# Patient Record
Sex: MALE | Race: WHITE | NOT HISPANIC OR LATINO | Employment: FULL TIME | URBAN - METROPOLITAN AREA
[De-identification: names, ages, dates, MRNs, and addresses within clinical notes are randomized per-mention and may not be internally consistent; named-entity substitution may affect disease eponyms.]

---

## 2017-01-03 RX ORDER — SIMVASTATIN 40 MG
TABLET ORAL
Qty: 30 TABLET | Refills: 0 | Status: SHIPPED | OUTPATIENT
Start: 2017-01-03 | End: 2017-02-03 | Stop reason: SDUPTHER

## 2017-01-03 RX ORDER — ATENOLOL 25 MG/1
TABLET ORAL
Qty: 30 TABLET | Refills: 0 | Status: SHIPPED | OUTPATIENT
Start: 2017-01-03 | End: 2017-02-03 | Stop reason: SDUPTHER

## 2017-02-03 RX ORDER — SIMVASTATIN 40 MG
TABLET ORAL
Qty: 30 TABLET | Refills: 0 | Status: SHIPPED | OUTPATIENT
Start: 2017-02-03 | End: 2017-02-13 | Stop reason: SDUPTHER

## 2017-02-03 RX ORDER — ATENOLOL 25 MG/1
TABLET ORAL
Qty: 30 TABLET | Refills: 0 | Status: SHIPPED | OUTPATIENT
Start: 2017-02-03 | End: 2017-02-13 | Stop reason: SDUPTHER

## 2017-02-13 ENCOUNTER — OFFICE VISIT (OUTPATIENT)
Dept: FAMILY MEDICINE CLINIC | Facility: CLINIC | Age: 62
End: 2017-02-13

## 2017-02-13 VITALS
HEIGHT: 69 IN | DIASTOLIC BLOOD PRESSURE: 80 MMHG | BODY MASS INDEX: 30.51 KG/M2 | WEIGHT: 206 LBS | TEMPERATURE: 98.4 F | SYSTOLIC BLOOD PRESSURE: 132 MMHG

## 2017-02-13 DIAGNOSIS — I70.90 ARTERIAL OCCLUSION DUE TO ARTERIOSCLEROSIS: ICD-10-CM

## 2017-02-13 DIAGNOSIS — K21.00 GASTROESOPHAGEAL REFLUX DISEASE WITH ESOPHAGITIS: ICD-10-CM

## 2017-02-13 DIAGNOSIS — C91.01 ACUTE LYMPHOBLASTIC LEUKEMIA (ALL) IN REMISSION (HCC): Primary | ICD-10-CM

## 2017-02-13 DIAGNOSIS — E78.5 HYPERLIPIDEMIA, ACQUIRED: ICD-10-CM

## 2017-02-13 PROCEDURE — 99213 OFFICE O/P EST LOW 20 MIN: CPT | Performed by: FAMILY MEDICINE

## 2017-02-13 RX ORDER — SIMVASTATIN 40 MG
40 TABLET ORAL NIGHTLY
Qty: 30 TABLET | Refills: 5 | Status: SHIPPED | OUTPATIENT
Start: 2017-02-13 | End: 2017-08-14 | Stop reason: SDUPTHER

## 2017-02-13 RX ORDER — ATENOLOL 25 MG/1
25 TABLET ORAL DAILY
Qty: 30 TABLET | Refills: 5 | Status: SHIPPED | OUTPATIENT
Start: 2017-02-13 | End: 2017-08-14 | Stop reason: SDUPTHER

## 2017-02-13 NOTE — PROGRESS NOTES
Chief Complaint   Patient presents with   • Hyperlipidemia     follow up       Subjective.../HPI  Patient present today with hyperlipidemia and htn . No problems with meds. 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 Has been to dr kong from vascular for left subclavian occlusion- subclavian steel syndrome.    I have reviewed the patient's medical history in detail and updated the computerized patient record.    Family History   Problem Relation Age of Onset   • Stroke Mother    • Heart attack Mother    • Alcohol abuse Father    • No Known Problems Sister    • No Known Problems Sister    • Hypertension Other        Social History     Social History   • Marital status:      Spouse name: N/A   • Number of children: N/A   • Years of education: N/A     Occupational History   •  [employer]     Social History Main Topics   • Smoking status: Former Smoker     Packs/day: 0.50     Types: Cigarettes     Quit date: 1/3/2016   • Smokeless tobacco: Former User   • Alcohol use No      Comment: daily   • Drug use: Not on file   • Sexual activity: Yes     Partners: Female     Birth control/ protection: None     Other Topics Concern   • Not on file     Social History Narrative       Review of Systems:   Review of Systems   Constitutional: Negative for chills, fatigue, fever and unexpected weight change.   HENT: Negative for ear pain, hearing loss, sinus pressure, sore throat and tinnitus.    Eyes: Negative for pain, discharge and redness.   Respiratory: Negative for cough, shortness of breath and wheezing.    Cardiovascular: Negative for chest pain, palpitations and leg swelling.   Gastrointestinal: Negative for abdominal pain, constipation, diarrhea and nausea.   Endocrine: Negative for cold intolerance and heat intolerance.   Genitourinary: Negative for difficulty urinating, flank pain and urgency.   Musculoskeletal: Negative for back pain, joint swelling and myalgias.   Skin: Negative for rash  "and wound.   Allergic/Immunologic: Negative for environmental allergies and food allergies.   Neurological: Negative for dizziness, seizures, numbness and headaches.   Hematological: Negative for adenopathy. Does not bruise/bleed easily.   Psychiatric/Behavioral: Negative for decreased concentration, dysphoric mood and sleep disturbance. The patient is not nervous/anxious.    All other systems reviewed and are negative.      Objective:  Vital Signs     Vitals:    02/13/17 1126   BP: 132/80   BP Location: Right arm   Patient Position: Sitting   Temp: 98.4 °F (36.9 °C)   TempSrc: Oral   Weight: 206 lb (93.4 kg)   Height: 69.29\" (176 cm)     Physical Exam   Constitutional: He is oriented to person, place, and time. He appears well-developed and well-nourished.   HENT:   Head: Normocephalic.   Eyes: Pupils are equal, round, and reactive to light.   Neck: Normal range of motion.   Cardiovascular: Normal rate and regular rhythm.    Pulmonary/Chest: Effort normal.   Abdominal: Soft.   Musculoskeletal: Normal range of motion.   Neurological: He is alert and oriented to person, place, and time.   Skin: Skin is warm and dry.   Psychiatric: He has a normal mood and affect. His behavior is normal. Judgment and thought content normal.        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

## 2017-03-20 ENCOUNTER — HOSPITAL ENCOUNTER (OUTPATIENT)
Dept: GENERAL RADIOLOGY | Facility: HOSPITAL | Age: 62
Discharge: HOME | End: 2017-03-20
Attending: FAMILY MEDICINE

## 2017-03-20 ENCOUNTER — OFFICE VISIT (OUTPATIENT)
Dept: FAMILY MEDICINE CLINIC | Facility: CLINIC | Age: 62
End: 2017-03-20

## 2017-03-20 ENCOUNTER — HOSPITAL ENCOUNTER (OUTPATIENT)
Dept: GENERAL RADIOLOGY | Facility: HOSPITAL | Age: 62
End: 2017-03-20
Attending: FAMILY MEDICINE

## 2017-03-20 VITALS
TEMPERATURE: 99.2 F | HEIGHT: 69 IN | SYSTOLIC BLOOD PRESSURE: 142 MMHG | DIASTOLIC BLOOD PRESSURE: 90 MMHG | WEIGHT: 209.6 LBS | OXYGEN SATURATION: 98 % | BODY MASS INDEX: 31.04 KG/M2 | HEART RATE: 81 BPM

## 2017-03-20 DIAGNOSIS — S23.9XXA THORACIC SPRAIN: Primary | ICD-10-CM

## 2017-03-20 DIAGNOSIS — S33.5XXA LUMBAR SPRAIN, INITIAL ENCOUNTER: ICD-10-CM

## 2017-03-20 PROCEDURE — 99213 OFFICE O/P EST LOW 20 MIN: CPT | Performed by: FAMILY MEDICINE

## 2017-03-20 RX ORDER — CYCLOBENZAPRINE HCL 10 MG
10 TABLET ORAL NIGHTLY PRN
Qty: 30 TABLET | Refills: 0 | Status: SHIPPED | OUTPATIENT
Start: 2017-03-20 | End: 2017-03-27

## 2017-03-20 RX ORDER — METHYLPREDNISOLONE 4 MG/1
TABLET ORAL
Qty: 21 TABLET | Refills: 0 | Status: SHIPPED | OUTPATIENT
Start: 2017-03-20 | End: 2017-03-27

## 2017-03-20 NOTE — PROGRESS NOTES
"  Chief Complaint   Patient presents with   • Back Pain     pt is been with middle back pain for 3 days is a permanet pain, it get worse when he moves ,       Subjective.../HPI  Patient present today with back pain x 5 days. No trauma. Pain on right. On same mattress. Not on couch. No leg pain.     I have reviewed the patient's medical history in detail and updated the computerized patient record.    Family History   Problem Relation Age of Onset   • Stroke Mother    • Heart attack Mother    • Alcohol abuse Father    • No Known Problems Sister    • No Known Problems Sister    • Hypertension Other        Social History     Social History   • Marital status:      Spouse name: N/A   • Number of children: N/A   • Years of education: N/A     Occupational History   •  [employer]     Social History Main Topics   • Smoking status: Former Smoker     Packs/day: 0.50     Types: Cigarettes     Quit date: 1/3/2016   • Smokeless tobacco: Former User   • Alcohol use No      Comment: daily   • Drug use: Not on file   • Sexual activity: Yes     Partners: Female     Birth control/ protection: None     Other Topics Concern   • Not on file     Social History Narrative       Review of Systems:   Review of Systems   Constitutional: Negative.    HENT: Negative.    Eyes: Negative.    Respiratory: Negative.    Cardiovascular: Negative.    Gastrointestinal: Positive for constipation.   Endocrine: Negative.    Musculoskeletal: Positive for back pain.   Skin: Negative.    Allergic/Immunologic: Positive for environmental allergies.   Neurological: Negative.    Hematological: Negative.    Psychiatric/Behavioral: Negative.        Objective:  Vital Signs     Vitals:    03/20/17 1101   BP: 142/90   BP Location: Right arm   Patient Position: Sitting   Pulse: 81   Temp: 99.2 °F (37.3 °C)   TempSrc: Oral   SpO2: 98%   Weight: 209 lb 9.6 oz (95.1 kg)   Height: 69.29\" (176 cm)     Physical Exam   Constitutional: He is oriented to " person, place, and time. He appears well-developed and well-nourished.   HENT:   Head: Normocephalic.   Eyes: Pupils are equal, round, and reactive to light.   Neck: Normal range of motion.   Cardiovascular: Normal rate and regular rhythm.    Pulmonary/Chest: Effort normal.   Musculoskeletal: He exhibits tenderness (right paraspinal muscle in spasm).   Swollen and tender right paraspinal muscle   Neurological: He is alert and oriented to person, place, and time.   Skin: Skin is warm and dry.        Results Review:      REVIEWED AND DISCUSSED CLINICAL RESULTS WITH PATIENT                          Current Outpatient Prescriptions:   •  aspirin 81 MG tablet, Take 81 mg by mouth daily., Disp: , Rfl:   •  atenolol (TENORMIN) 25 MG tablet, Take 1 tablet by mouth Daily., Disp: 30 tablet, Rfl: 5  •  simvastatin (ZOCOR) 40 MG tablet, Take 1 tablet by mouth Every Night., Disp: 30 tablet, Rfl: 5  •  cyclobenzaprine (FLEXERIL) 10 MG tablet, Take 1 tablet by mouth At Night As Needed for Muscle Spasms., Disp: 30 tablet, Rfl: 0  •  MethylPREDNISolone (MEDROL, THOM,) 4 MG tablet, Take as directed on package instructions., Disp: 21 tablet, Rfl: 0    Procedures    Assessment/Plan     Diagnoses and all orders for this visit:    Thoracic sprain  -     XR spine thoracic 3 vw  -     XR spine lumbar ap and lateral    Lumbar sprain, initial encounter  -     XR spine thoracic 3 vw  -     XR spine lumbar ap and lateral    Other orders  -     cyclobenzaprine (FLEXERIL) 10 MG tablet; Take 1 tablet by mouth At Night As Needed for Muscle Spasms.  -     MethylPREDNISolone (MEDROL, THOM,) 4 MG tablet; Take as directed on package instructions.         Teto Russell Jr., MD  03/20/17  11:43 AM

## 2017-03-27 ENCOUNTER — OFFICE VISIT (OUTPATIENT)
Dept: ONCOLOGY | Facility: CLINIC | Age: 62
End: 2017-03-27

## 2017-03-27 ENCOUNTER — LAB (OUTPATIENT)
Dept: LAB | Facility: HOSPITAL | Age: 62
End: 2017-03-27

## 2017-03-27 VITALS
DIASTOLIC BLOOD PRESSURE: 76 MMHG | WEIGHT: 210 LBS | BODY MASS INDEX: 31.1 KG/M2 | SYSTOLIC BLOOD PRESSURE: 142 MMHG | HEART RATE: 73 BPM | TEMPERATURE: 98.6 F | OXYGEN SATURATION: 99 % | HEIGHT: 69 IN | RESPIRATION RATE: 12 BRPM

## 2017-03-27 DIAGNOSIS — C91.10 CLL (CHRONIC LYMPHOCYTIC LEUKEMIA) (HCC): ICD-10-CM

## 2017-03-27 DIAGNOSIS — C91.11 CHRONIC LYMPHOCYTIC LEUKEMIA OF B-CELL TYPE IN REMISSION (HCC): Primary | ICD-10-CM

## 2017-03-27 LAB
BASOPHILS # BLD AUTO: 0.03 10*3/MM3 (ref 0–0.1)
BASOPHILS NFR BLD AUTO: 0.5 % (ref 0–1.1)
DEPRECATED RDW RBC AUTO: 39.3 FL (ref 37–49)
EOSINOPHIL # BLD AUTO: 0.1 10*3/MM3 (ref 0–0.36)
EOSINOPHIL NFR BLD AUTO: 1.6 % (ref 1–5)
ERYTHROCYTE [DISTWIDTH] IN BLOOD BY AUTOMATED COUNT: 12.4 % (ref 11.7–14.5)
HCT VFR BLD AUTO: 47.5 % (ref 40–49)
HGB BLD-MCNC: 15.9 G/DL (ref 13.5–16.5)
IMM GRANULOCYTES # BLD: 0.08 10*3/MM3 (ref 0–0.03)
IMM GRANULOCYTES NFR BLD: 1.3 % (ref 0–0.5)
LYMPHOCYTES # BLD AUTO: 0.92 10*3/MM3 (ref 1–3.5)
LYMPHOCYTES NFR BLD AUTO: 14.4 % (ref 20–49)
MCH RBC QN AUTO: 28.9 PG (ref 27–33)
MCHC RBC AUTO-ENTMCNC: 33.5 G/DL (ref 32–35)
MCV RBC AUTO: 86.4 FL (ref 83–97)
MONOCYTES # BLD AUTO: 0.63 10*3/MM3 (ref 0.25–0.8)
MONOCYTES NFR BLD AUTO: 9.9 % (ref 4–12)
NEUTROPHILS # BLD AUTO: 4.61 10*3/MM3 (ref 1.5–7)
NEUTROPHILS NFR BLD AUTO: 72.3 % (ref 39–75)
NRBC BLD MANUAL-RTO: 0 /100 WBC (ref 0–0)
PLATELET # BLD AUTO: 253 10*3/MM3 (ref 150–375)
PMV BLD AUTO: 8.8 FL (ref 8.9–12.1)
RBC # BLD AUTO: 5.5 10*6/MM3 (ref 4.3–5.5)
WBC NRBC COR # BLD: 6.37 10*3/MM3 (ref 4–10)

## 2017-03-27 PROCEDURE — 85025 COMPLETE CBC W/AUTO DIFF WBC: CPT | Performed by: INTERNAL MEDICINE

## 2017-03-27 PROCEDURE — 36416 COLLJ CAPILLARY BLOOD SPEC: CPT | Performed by: INTERNAL MEDICINE

## 2017-03-27 PROCEDURE — 99213 OFFICE O/P EST LOW 20 MIN: CPT | Performed by: INTERNAL MEDICINE

## 2017-07-31 ENCOUNTER — LAB (OUTPATIENT)
Dept: LAB | Facility: HOSPITAL | Age: 62
End: 2017-07-31

## 2017-07-31 ENCOUNTER — OFFICE VISIT (OUTPATIENT)
Dept: ONCOLOGY | Facility: CLINIC | Age: 62
End: 2017-07-31

## 2017-07-31 VITALS
HEART RATE: 76 BPM | TEMPERATURE: 98.4 F | BODY MASS INDEX: 31.46 KG/M2 | RESPIRATION RATE: 16 BRPM | WEIGHT: 212.4 LBS | SYSTOLIC BLOOD PRESSURE: 130 MMHG | DIASTOLIC BLOOD PRESSURE: 80 MMHG | HEIGHT: 69 IN

## 2017-07-31 DIAGNOSIS — C91.11 CHRONIC LYMPHOCYTIC LEUKEMIA OF B-CELL TYPE IN REMISSION (HCC): Primary | ICD-10-CM

## 2017-07-31 DIAGNOSIS — I70.90 ARTERIAL OCCLUSION DUE TO ARTERIOSCLEROSIS: ICD-10-CM

## 2017-07-31 DIAGNOSIS — C91.11 CHRONIC LYMPHOCYTIC LEUKEMIA OF B-CELL TYPE IN REMISSION (HCC): ICD-10-CM

## 2017-07-31 LAB
BASOPHILS # BLD AUTO: 0.04 10*3/MM3 (ref 0–0.1)
BASOPHILS NFR BLD AUTO: 0.7 % (ref 0–1.1)
DEPRECATED RDW RBC AUTO: 38.8 FL (ref 37–49)
EOSINOPHIL # BLD AUTO: 0.12 10*3/MM3 (ref 0–0.36)
EOSINOPHIL NFR BLD AUTO: 2.2 % (ref 1–5)
ERYTHROCYTE [DISTWIDTH] IN BLOOD BY AUTOMATED COUNT: 12.3 % (ref 11.7–14.5)
HCT VFR BLD AUTO: 44.1 % (ref 40–49)
HGB BLD-MCNC: 15.3 G/DL (ref 13.5–16.5)
IMM GRANULOCYTES # BLD: 0.03 10*3/MM3 (ref 0–0.03)
IMM GRANULOCYTES NFR BLD: 0.6 % (ref 0–0.5)
LYMPHOCYTES # BLD AUTO: 0.88 10*3/MM3 (ref 1–3.5)
LYMPHOCYTES NFR BLD AUTO: 16.2 % (ref 20–49)
MCH RBC QN AUTO: 30.1 PG (ref 27–33)
MCHC RBC AUTO-ENTMCNC: 34.7 G/DL (ref 32–35)
MCV RBC AUTO: 86.6 FL (ref 83–97)
MONOCYTES # BLD AUTO: 0.51 10*3/MM3 (ref 0.25–0.8)
MONOCYTES NFR BLD AUTO: 9.4 % (ref 4–12)
NEUTROPHILS # BLD AUTO: 3.86 10*3/MM3 (ref 1.5–7)
NEUTROPHILS NFR BLD AUTO: 70.9 % (ref 39–75)
NRBC BLD MANUAL-RTO: 0 /100 WBC (ref 0–0)
PLATELET # BLD AUTO: 228 10*3/MM3 (ref 150–375)
PMV BLD AUTO: 8.8 FL (ref 8.9–12.1)
RBC # BLD AUTO: 5.09 10*6/MM3 (ref 4.3–5.5)
WBC NRBC COR # BLD: 5.44 10*3/MM3 (ref 4–10)

## 2017-07-31 PROCEDURE — 36416 COLLJ CAPILLARY BLOOD SPEC: CPT | Performed by: INTERNAL MEDICINE

## 2017-07-31 PROCEDURE — 99213 OFFICE O/P EST LOW 20 MIN: CPT | Performed by: INTERNAL MEDICINE

## 2017-07-31 PROCEDURE — 85025 COMPLETE CBC W/AUTO DIFF WBC: CPT | Performed by: INTERNAL MEDICINE

## 2017-07-31 NOTE — PROGRESS NOTES
Subjective  Chronic lymphoid leukemia. As per 06/23/2016, the patient’s leukemia has gone into remission after therapy with Rituxan/bendamustine.  This is documented by physical examination, CBC, chemistry profile, beta-2 microglobulin that is normal, and most importantly, peripheral blood flow cytometry that has documented resolution of his monoclonal population of B cells.  We advised him to remain in observation, returning to see us every 3 months.  No need for any other pathological assessment under the present circumstances.       Left subclavian obstruction associated with symptomatology into the left upper extremity including claudication upon exercise, coldness and occasional numbness.  The patient has been seen by Dr. Stevan Hdz.      History of Present Illness      This patient is here today in order to reassess his chronic lymphoid leukemia that was treated with Rituxan and bendamustine in the past achieving a remission. He is here today with no B symptoms, no peripheral adenopathy, no fever, no chills, no infections, no autoimmunity, feeling extremely well. ECOG performance status 0. From the point of view of his arterial occlusion in the left subclavian artery no major symptomatology as long as he does not use his left upper extremity too much. No plans for him to have any surgery or stenting for this unless the clinical circumstances change. Otherwise the patient has not had any new health issues. Appetite is good, weight remains stable. All activity of urination remains normal. No cardiovascular or respiratory symptomatology. No rashes in the skin.         Past Medical History,   Past Medical History:   Diagnosis Date   • Cancer     chronic lymphoid leukemia   • History of rotator cuff strain    • Hyperlipidemia    • Hypertension    • Reflux esophagitis    • Subclavian vein obstruction      Social History     Social History   • Marital status:      Spouse name: N/A   • Number of children:  N/A   • Years of education: N/A     Occupational History   •  [employer]     Social History Main Topics   • Smoking status: Former Smoker     Packs/day: 0.50     Types: Cigarettes     Quit date: 1/3/2016   • Smokeless tobacco: Former User   • Alcohol use No      Comment: daily   • Drug use: Not on file   • Sexual activity: Yes     Partners: Female     Birth control/ protection: None     Other Topics Concern   • Not on file     Social History Narrative         Review of Systems    General: no fever, chills, fatigue, weight changes, or lack of appetite.  Eyes: no epiphora, conjunctivitis, pain, glaucoma, blurred vision, blindness, secretion, photophobia.  Ears: no otorrhea, tinnitus, otorrhagia, deafness, pain, vertigo.  Nose: no rhinorrhea, epistaxis, alteration in perception of odors, sinuses pressure.  Mouth: no alteration in gums ,, no difficulty with mastication or deglut ion, no odynophagia.  Neck: no masses or pain, no thyroid alterations, no pain in muscles or arteries, no carotid odynia, no crepitation.  Lungs: no cough, sputum production, dyspnea, trepopnea, pleuritic pain, hemoptysis.  Heart: no syncope, irregularity, palpitations, angina, orthopnea, paroxysmal nocturnal dyspnea.  GI: no dysphagia, odynophagia, no regurgitation, positive for heartburn and indigestion,no nausea, vomiting, hematemesis ,melena, jaundice, distention, obstipation, enterorrhagia, proctalgia, anal  Lesions, changes in bowel habits.  : no frequency, hesitancy, hematuria, discharge, pain.  Musculoskeletal: no muscle or tendon pain or inflammation, joint pain, edema, functional limitation, fasciculations, mass..  Neurologic: no headache, seizures, alterations on Craneal nerves, no motor or senssory deficit, normal coordination.  Skin: no rashes, pruritus or localized lesions.  Psychiatric: no anxiety, depression, agitation, delusions, proper insight.  .    Medications:  The current medication list was reviewed in  "the EMR    ALLERGIES:    Allergies   Allergen Reactions   • Ketoprofen Hives       Objective      Vitals:    07/31/17 0819   BP: 130/80   Pulse: 76   Resp: 16   Temp: 98.4 °F (36.9 °C)   Weight: 212 lb 6.4 oz (96.3 kg)   Height: 68.9\" (175 cm)  Comment: new ht.   PainSc: 0-No pain     Current Status 7/31/2017   ECOG score 0       Physical Exam    GENERAL:  Well-developed, well-nourished  Patient  in no acute distress.   SKIN:  Warm, dry without rashes, purpura or petechiae.  HEENT:  Pupils were equal and reactive to light and accomodation, conjunctivas non injected, no pterigion, normal extraocular movements, normal visual acuity.   Mouth mucosa was moist, no exudates in oropharynx, normal gum line, normal roof of the mouth and pillars, normal papillations of the tongue.SWOLLEN R SNEHA K WITH PAIN  NECK:  Supple with good range of motion; no thyromegaly or masses, no JVD or bruits, no cervical adenopathies.  LYMPHATICS:  No cervical, supraclavicular, axillary or inguinal adenopathy.  CHEST:  Lungs clear to percussion and auscultation, normal breath sounds bilaterally, no wheezing, crackles or ronchi, no stridor.  CARDIAC:  Regular rate and rhythm without murmurs, rubs or gallops.  ABDOMEN:  Soft, nontender with no organomegaly or masses, no ascites, no collateral circulation, no abdominal pain, no inguinal hernias, no abdominal bruits.  EXTREMITIES:  No clubbing, cyanosis or edema, no deformities or pain.Poor pulses and temperature in left upper extremity, obvious decrease in temperature, no paleness or cyanosis.  NEUROLOGICAL:  Patient was awake, alert, oriented to time, person and place,normal gait and coordination,     RECENT LABS:  Hematology WBC   Date Value Ref Range Status   07/31/2017 5.44 4.00 - 10.00 10*3/mm3 Final     RBC   Date Value Ref Range Status   07/31/2017 5.09 4.30 - 5.50 10*6/mm3 Final     Hemoglobin   Date Value Ref Range Status   07/31/2017 15.3 13.5 - 16.5 g/dL Final     Hematocrit   Date " Value Ref Range Status   07/31/2017 44.1 40.0 - 49.0 % Final     Platelets   Date Value Ref Range Status   07/31/2017 228 150 - 375 10*3/mm3 Final        Component      Latest Ref Rng & Units 3/27/2017 7/31/2017   WBC      4.00 - 10.00 10*3/mm3 6.37 5.44   RBC      4.30 - 5.50 10*6/mm3 5.50 5.09   Hemoglobin      13.5 - 16.5 g/dL 15.9 15.3   Hematocrit      40.0 - 49.0 % 47.5 44.1   MCV      83.0 - 97.0 fL 86.4 86.6   MCH      27.0 - 33.0 pg 28.9 30.1   MCHC      32.0 - 35.0 g/dL 33.5 34.7   RDW      11.7 - 14.5 % 12.4 12.3   RDW-SD      37.0 - 49.0 fl 39.3 38.8   MPV      8.9 - 12.1 fL 8.8 (L) 8.8 (L)   Platelets      150 - 375 10*3/mm3 253 228   Neutrophil %      39.0 - 75.0 % 72.3 70.9   Lymphocyte %      20.0 - 49.0 % 14.4 (L) 16.2 (L)   Monocyte %      4.0 - 12.0 % 9.9 9.4   Eosinophil %      1.0 - 5.0 % 1.6 2.2   Basophil %      0.0 - 1.1 % 0.5 0.7   Immature Grans %      0.0 - 0.5 % 1.3 (H) 0.6 (H)   Neutrophils, Absolute      1.50 - 7.00 10*3/mm3 4.61 3.86   Lymphocytes, Absolute      1.00 - 3.50 10*3/mm3 0.92 (L) 0.88 (L)   Monocytes, Absolute      0.25 - 0.80 10*3/mm3 0.63 0.51   Eosinophils, Absolute      0.00 - 0.36 10*3/mm3 0.10 0.12   Basophils, Absolute      0.00 - 0.10 10*3/mm3 0.03 0.04   Immature Grans, Absolute      0.00 - 0.03 10*3/mm3 0.08 (H) 0.03   nRBC      0.0 - 0.0 /100 WBC 0.0 0.0         Assessment/Plan   1.  This patient has had a very dramatic response to chemotherapy with bendamustine and Rituxan in the treatment of CLL. .  The patient has not had any obvious adenopathy; no hepatosplenomegaly, no B symptoms, no autoimmunity, no infections. .  His white count, hemoglobin and platelets have returned to normality.  The white count differential does not show any further lymphocytosis.  .  We propose a plan of care with just blood count every 6 months for the next year.  2.  Atherosclerotic obstruction of the left subclavian artery.  The patient is no longer smoking.  He is taking aspirin,  atenolol and cholesterol medicine with dramatic improvement in his lipid profile.  Again, most importantly, the patient is no longer smoking.    3.  Review him back in 6 months with a CBC.  No need for radiological assessment at this time of any nature unless the clinical circumstances change.                          7/31/2017      CC:

## 2017-08-14 ENCOUNTER — OFFICE VISIT (OUTPATIENT)
Dept: FAMILY MEDICINE CLINIC | Facility: CLINIC | Age: 62
End: 2017-08-14

## 2017-08-14 VITALS
BODY MASS INDEX: 30.81 KG/M2 | SYSTOLIC BLOOD PRESSURE: 118 MMHG | HEART RATE: 69 BPM | HEIGHT: 69 IN | DIASTOLIC BLOOD PRESSURE: 84 MMHG | TEMPERATURE: 98.2 F | WEIGHT: 208 LBS

## 2017-08-14 DIAGNOSIS — E78.01 FAMILIAL HYPERCHOLESTEROLEMIA: ICD-10-CM

## 2017-08-14 DIAGNOSIS — I10 ESSENTIAL HYPERTENSION: Primary | ICD-10-CM

## 2017-08-14 PROCEDURE — 99213 OFFICE O/P EST LOW 20 MIN: CPT | Performed by: FAMILY MEDICINE

## 2017-08-14 PROCEDURE — 90471 IMMUNIZATION ADMIN: CPT | Performed by: FAMILY MEDICINE

## 2017-08-14 PROCEDURE — 90715 TDAP VACCINE 7 YRS/> IM: CPT | Performed by: FAMILY MEDICINE

## 2017-08-14 RX ORDER — SIMVASTATIN 40 MG
40 TABLET ORAL NIGHTLY
Qty: 30 TABLET | Refills: 5 | Status: SHIPPED | OUTPATIENT
Start: 2017-08-14 | End: 2018-02-07 | Stop reason: SDUPTHER

## 2017-08-14 RX ORDER — ATENOLOL 25 MG/1
25 TABLET ORAL DAILY
Qty: 30 TABLET | Refills: 5 | Status: SHIPPED | OUTPATIENT
Start: 2017-08-14 | End: 2018-02-07 | Stop reason: SDUPTHER

## 2017-08-14 NOTE — PROGRESS NOTES
"  Chief Complaint   Patient presents with   • Hyperlipidemia     follow up pt doing well,no complains       Subjective.../HPI  Patient present today with htn  - hyperlipidemia and doing well on meds. No myalgia    I have reviewed the patient's medical history in detail and updated the computerized patient record.    Family History   Problem Relation Age of Onset   • Stroke Mother    • Heart attack Mother    • Alcohol abuse Father    • No Known Problems Sister    • No Known Problems Sister    • Hypertension Other        Social History     Social History   • Marital status:      Spouse name: N/A   • Number of children: N/A   • Years of education: N/A     Occupational History   •  [employer]     Social History Main Topics   • Smoking status: Former Smoker     Packs/day: 0.50     Types: Cigarettes     Quit date: 1/3/2016   • Smokeless tobacco: Former User   • Alcohol use No      Comment: daily   • Drug use: Not on file   • Sexual activity: Yes     Partners: Female     Birth control/ protection: None     Other Topics Concern   • Not on file     Social History Narrative       Review of Systems:   Review of Systems   Constitutional: Negative.    HENT: Negative.    Eyes: Negative.    Respiratory: Negative.    Cardiovascular: Negative.    Gastrointestinal: Negative.    Endocrine: Negative.    Genitourinary: Negative.    Musculoskeletal: Negative.    Skin: Negative.    Allergic/Immunologic: Negative.    Neurological: Negative.    Hematological: Negative.    Psychiatric/Behavioral: Negative.        Objective:  Vital Signs     Vitals:    08/14/17 1150   BP: 118/84   BP Location: Right arm   Patient Position: Sitting   Pulse: 69   Temp: 98.2 °F (36.8 °C)   TempSrc: Oral   Weight: 208 lb (94.3 kg)   Height: 68.5\" (174 cm)     Physical Exam   Constitutional: He is oriented to person, place, and time. He appears well-developed and well-nourished.   HENT:   Head: Normocephalic.   Eyes: Pupils are equal, " round, and reactive to light.   Neck: Normal range of motion.   Cardiovascular: Normal rate, regular rhythm and normal heart sounds.    Pulmonary/Chest: Effort normal.   Abdominal: Soft.   Musculoskeletal: Normal range of motion.   Neurological: He is alert and oriented to person, place, and time.   Skin: Skin is warm and dry.        Results Review:      REVIEWED AND DISCUSSED CLINICAL RESULTS WITH PATIENT                          Current Outpatient Prescriptions:   •  aspirin 81 MG tablet, Take 81 mg by mouth daily., Disp: , Rfl:   •  atenolol (TENORMIN) 25 MG tablet, Take 1 tablet by mouth Daily., Disp: 30 tablet, Rfl: 5  •  simvastatin (ZOCOR) 40 MG tablet, Take 1 tablet by mouth Every Night., Disp: 30 tablet, Rfl: 5    Procedures    Assessment/Plan     Diagnoses and all orders for this visit:    Essential hypertension    Familial hypercholesterolemia    Other orders  -     atenolol (TENORMIN) 25 MG tablet; Take 1 tablet by mouth Daily.  -     simvastatin (ZOCOR) 40 MG tablet; Take 1 tablet by mouth Every Night.  -     Tdap Vaccine Greater Than or Equal To 6yo          Teto Russell Jr., MD  08/14/17  12:40 PM

## 2018-01-23 ENCOUNTER — LAB (OUTPATIENT)
Dept: OTHER | Facility: HOSPITAL | Age: 63
End: 2018-01-23

## 2018-01-23 ENCOUNTER — OFFICE VISIT (OUTPATIENT)
Dept: ONCOLOGY | Facility: CLINIC | Age: 63
End: 2018-01-23

## 2018-01-23 VITALS
TEMPERATURE: 98.4 F | RESPIRATION RATE: 12 BRPM | DIASTOLIC BLOOD PRESSURE: 82 MMHG | WEIGHT: 212 LBS | OXYGEN SATURATION: 100 % | SYSTOLIC BLOOD PRESSURE: 140 MMHG | HEART RATE: 72 BPM | BODY MASS INDEX: 31.4 KG/M2 | HEIGHT: 69 IN

## 2018-01-23 DIAGNOSIS — C91.11 CHRONIC LYMPHOCYTIC LEUKEMIA OF B-CELL TYPE IN REMISSION (HCC): Primary | ICD-10-CM

## 2018-01-23 DIAGNOSIS — C91.11 CHRONIC LYMPHOCYTIC LEUKEMIA OF B-CELL TYPE IN REMISSION (HCC): ICD-10-CM

## 2018-01-23 DIAGNOSIS — I70.90 ARTERIAL OCCLUSION DUE TO ARTERIOSCLEROSIS: ICD-10-CM

## 2018-01-23 LAB
BASOPHILS # BLD AUTO: 0.03 10*3/MM3 (ref 0–0.2)
BASOPHILS NFR BLD AUTO: 0.5 % (ref 0–1.5)
DEPRECATED RDW RBC AUTO: 38.5 FL (ref 37–54)
EOSINOPHIL # BLD AUTO: 0.08 10*3/MM3 (ref 0–0.7)
EOSINOPHIL NFR BLD AUTO: 1.2 % (ref 0.3–6.2)
ERYTHROCYTE [DISTWIDTH] IN BLOOD BY AUTOMATED COUNT: 12.6 % (ref 11.5–14.5)
HCT VFR BLD AUTO: 45.7 % (ref 40.4–52.2)
HGB BLD-MCNC: 16.1 G/DL (ref 13.7–17.6)
IMM GRANULOCYTES # BLD: 0.03 10*3/MM3 (ref 0–0.03)
IMM GRANULOCYTES NFR BLD: 0.5 % (ref 0–0.5)
LYMPHOCYTES # BLD AUTO: 0.99 10*3/MM3 (ref 0.9–4.8)
LYMPHOCYTES NFR BLD AUTO: 15.4 % (ref 19.6–45.3)
MCH RBC QN AUTO: 29.9 PG (ref 27–32.7)
MCHC RBC AUTO-ENTMCNC: 35.2 G/DL (ref 32.6–36.4)
MCV RBC AUTO: 84.9 FL (ref 79.8–96.2)
MONOCYTES # BLD AUTO: 0.42 10*3/MM3 (ref 0.2–1.2)
MONOCYTES NFR BLD AUTO: 6.5 % (ref 5–12)
NEUTROPHILS # BLD AUTO: 4.88 10*3/MM3 (ref 1.9–8.1)
NEUTROPHILS NFR BLD AUTO: 75.9 % (ref 42.7–76)
NRBC BLD MANUAL-RTO: 0 /100 WBC (ref 0–0)
PLATELET # BLD AUTO: 258 10*3/MM3 (ref 140–500)
PMV BLD AUTO: 8.8 FL (ref 6–12)
RBC # BLD AUTO: 5.38 10*6/MM3 (ref 4.6–6)
WBC NRBC COR # BLD: 6.43 10*3/MM3 (ref 4.5–10.7)

## 2018-01-23 PROCEDURE — 36415 COLL VENOUS BLD VENIPUNCTURE: CPT

## 2018-01-23 PROCEDURE — 85025 COMPLETE CBC W/AUTO DIFF WBC: CPT | Performed by: INTERNAL MEDICINE

## 2018-01-23 PROCEDURE — 99213 OFFICE O/P EST LOW 20 MIN: CPT | Performed by: INTERNAL MEDICINE

## 2018-01-23 NOTE — PROGRESS NOTES
Subjective  1.Chronic lymphoid leukemia. As per 06/23/2016, the patient’s leukemia has gone into remission after therapy with Rituxan/bendamustine.  This is documented by physical examination, CBC, chemistry profile, beta-2 microglobulin that is normal, and most importantly, peripheral blood flow cytometry that has documented resolution of his monoclonal population of B cells.  We advised him to remain in observation, returning to see us every 3 months.  No need for any other pathological assessment under the present circumstances.       2.Left subclavian obstruction associated with symptomatology into the left upper extremity including claudication upon exercise, coldness and occasional numbness.  The patient has been seen by Dr. Stevan Hdz.      History of Present Illness      This patient is here today in order to reassess his chronic lymphoid leukemia that was treated with Rituxan and bendamustine in the past achieving a remission. He is here today with no B symptoms, no peripheral adenopathy, no fever, no chills, no infections, no autoimmunity, feeling extremely well. ECOG performance status 0. From the point of view of his arterial occlusion in the left subclavian artery no major symptomatology as long as he does not use his left upper extremity too much. No plans for him to have any surgery or stenting for this unless the clinical circumstances change. Otherwise the patient has not had any new health issues. Appetite is good, weight remains stable. All activity of urination remains normal. No cardiovascular or respiratory symptomatology. No rashes in the skin.         Past Medical History,   Past Medical History:   Diagnosis Date   • Cancer     chronic lymphoid leukemia   • History of rotator cuff strain    • Hyperlipidemia    • Hypertension    • Reflux esophagitis    • Subclavian vein obstruction      Social History     Social History   • Marital status:      Spouse name: N/A   • Number of  children: N/A   • Years of education: N/A     Occupational History   •  [employer]     Social History Main Topics   • Smoking status: Former Smoker     Packs/day: 0.50     Types: Cigarettes     Quit date: 1/3/2016   • Smokeless tobacco: Former User   • Alcohol use No      Comment: daily   • Drug use: Not on file   • Sexual activity: Yes     Partners: Female     Birth control/ protection: None     Other Topics Concern   • Not on file     Social History Narrative         Review of Systems    General: no fever, chills, fatigue, weight changes, or lack of appetite.  Eyes: no epiphora, conjunctivitis, pain, glaucoma, blurred vision, blindness, secretion, photophobia.  Ears: no otorrhea, tinnitus, otorrhagia, deafness, pain, vertigo.  Nose: no rhinorrhea, epistaxis, alteration in perception of odors, sinuses pressure.  Mouth: no alteration in gums ,, no difficulty with mastication or deglut ion, no odynophagia.  Neck: no masses or pain, no thyroid alterations, no pain in muscles or arteries, no carotid odynia, no crepitation.  Lungs: no cough, sputum production, dyspnea, trepopnea, pleuritic pain, hemoptysis.  Heart: no syncope, irregularity, palpitations, angina, orthopnea, paroxysmal nocturnal dyspnea.  GI: no dysphagia, odynophagia, no regurgitation, positive for heartburn and indigestion,no nausea, vomiting, hematemesis ,melena, jaundice, distention, obstipation, enterorrhagia, proctalgia, anal  Lesions, changes in bowel habits.  : no frequency, hesitancy, hematuria, discharge, pain.  Musculoskeletal: no muscle or tendon pain or inflammation, joint pain, edema, functional limitation, fasciculations, mass..  Neurologic: no headache, seizures, alterations on Craneal nerves, no motor or senssory deficit, normal coordination.  Skin: no rashes, pruritus or localized lesions.  Psychiatric: no anxiety, depression, agitation, delusions, proper insight.  .    Medications:  The current medication list was  "reviewed in the EMR    ALLERGIES:    Allergies   Allergen Reactions   • Ketoprofen Hives       Objective      Vitals:    01/23/18 0850   BP: 140/82   Pulse: 72   Resp: 12   Temp: 98.4 °F (36.9 °C)   TempSrc: Oral   SpO2: 100%   Weight: 96.2 kg (212 lb)   Height: 175 cm (68.9\")   PainSc: 0-No pain     Current Status 1/23/2018   ECOG score 0       Physical Exam    GENERAL:  Well-developed, well-nourished  Patient  in no acute distress.   SKIN:  Warm, dry without rashes, purpura or petechiae.  HEENT:  Pupils were equal and reactive to light and accomodation, conjunctivas non injected, no pterigion, normal extraocular movements, normal visual acuity.   Mouth mucosa was moist, no exudates in oropharynx, normal gum line, normal roof of the mouth and pillars, normal papillations of the tongue.SWOLLEN R SNEHA K WITH PAIN  NECK:  Supple with good range of motion; no thyromegaly or masses, no JVD or bruits, no cervical adenopathies.  LYMPHATICS:  No cervical, supraclavicular, axillary or inguinal adenopathy.  CHEST:  Lungs clear to percussion and auscultation, normal breath sounds bilaterally, no wheezing, crackles or ronchi, no stridor.  CARDIAC:  Regular rate and rhythm without murmurs, rubs or gallops.  ABDOMEN:  Soft, nontender with no organomegaly or masses, no ascites, no collateral circulation, no abdominal pain, no inguinal hernias, no abdominal bruits.  EXTREMITIES:  No clubbing, cyanosis or edema, no deformities or pain.Poor pulses and temperature in left upper extremity, obvious decrease in temperature, no paleness or cyanosis.  NEUROLOGICAL:  Patient was awake, alert, oriented to time, person and place,normal gait and coordination,     RECENT LABS:  Hematology WBC   Date Value Ref Range Status   01/23/2018 6.43 4.50 - 10.70 10*3/mm3 Final     RBC   Date Value Ref Range Status   01/23/2018 5.38 4.60 - 6.00 10*6/mm3 Final     Hemoglobin   Date Value Ref Range Status   01/23/2018 16.1 13.7 - 17.6 g/dL Final "     Hematocrit   Date Value Ref Range Status   01/23/2018 45.7 40.4 - 52.2 % Final     Platelets   Date Value Ref Range Status   01/23/2018 258 140 - 500 10*3/mm3 Final        Component      Latest Ref Rng & Units 3/27/2017 7/31/2017 1/23/2018           8:23 AM  8:11 AM  8:35 AM   WBC      4.50 - 10.70 10*3/mm3 6.37 5.44 6.43   RBC      4.60 - 6.00 10*6/mm3 5.50 5.09 5.38   Hemoglobin      13.7 - 17.6 g/dL 15.9 15.3 16.1   Hematocrit      40.4 - 52.2 % 47.5 44.1 45.7   MCV      79.8 - 96.2 fL 86.4 86.6 84.9   MCH      27.0 - 32.7 pg 28.9 30.1 29.9   MCHC      32.6 - 36.4 g/dL 33.5 34.7 35.2   RDW      11.5 - 14.5 % 12.4 12.3 12.6   RDW-SD      37.0 - 54.0 fl 39.3 38.8 38.5   MPV      6.0 - 12.0 fL 8.8 (L) 8.8 (L) 8.8   Platelets      140 - 500 10*3/mm3 253 228 258   Neutrophil %      42.7 - 76.0 % 72.3 70.9 75.9   Lymphocyte %      19.6 - 45.3 % 14.4 (L) 16.2 (L) 15.4 (L)   Monocyte %      5.0 - 12.0 % 9.9 9.4 6.5   Eosinophil %      0.3 - 6.2 % 1.6 2.2 1.2   Basophil %      0.0 - 1.5 % 0.5 0.7 0.5   Immature Grans %      0.0 - 0.5 % 1.3 (H) 0.6 (H) 0.5   Neutrophils, Absolute      1.90 - 8.10 10*3/mm3 4.61 3.86 4.88   Lymphocytes, Absolute      0.90 - 4.80 10*3/mm3 0.92 (L) 0.88 (L) 0.99   Monocytes, Absolute      0.20 - 1.20 10*3/mm3 0.63 0.51 0.42   Eosinophils, Absolute      0.00 - 0.70 10*3/mm3 0.10 0.12 0.08   Basophils, Absolute      0.00 - 0.20 10*3/mm3 0.03 0.04 0.03   Immature Grans, Absolute      0.00 - 0.03 10*3/mm3 0.08 (H) 0.03 0.03   nRBC      0.0 - 0.0 /100 WBC 0.0 0.0 0.0       Assessment/Plan   1.  This patient has had a very dramatic response to chemotherapy with bendamustine and Rituxan in the treatment of CLL. .  The patient has not had any obvious adenopathy; no hepatosplenomegaly, no B symptoms, no autoimmunity, no infections. .  His white count, hemoglobin and platelets have returned to normality.  The white count differential does not show any further lymphocytosis.  .  We propose a plan of  care with just blood count every 6 months for the next year.  2.  Atherosclerotic obstruction of the left subclavian artery.  The patient is no longer smoking.  He is taking aspirin, atenolol and cholesterol medicine with dramatic improvement in his lipid profile.  Again, most importantly, the patient is no longer smoking.    3.  Review him back in 4 months with a CBC.  No need for radiological assessment at this time of any nature unless the clinical circumstances change.   4. REFERRAL TO FAMILY MEDICINE                       1/23/2018      CC:

## 2018-02-07 RX ORDER — ATENOLOL 25 MG/1
25 TABLET ORAL DAILY
Qty: 30 TABLET | Refills: 3 | Status: SHIPPED | OUTPATIENT
Start: 2018-02-07 | End: 2018-02-12 | Stop reason: SDUPTHER

## 2018-02-07 RX ORDER — SIMVASTATIN 40 MG
40 TABLET ORAL NIGHTLY
Qty: 30 TABLET | Refills: 3 | Status: SHIPPED | OUTPATIENT
Start: 2018-02-07 | End: 2018-02-12 | Stop reason: SDUPTHER

## 2018-02-12 RX ORDER — ATENOLOL 25 MG/1
25 TABLET ORAL DAILY
Qty: 30 TABLET | Refills: 3 | Status: SHIPPED | OUTPATIENT
Start: 2018-02-12 | End: 2018-05-29 | Stop reason: SDUPTHER

## 2018-02-12 RX ORDER — SIMVASTATIN 40 MG
40 TABLET ORAL NIGHTLY
Qty: 30 TABLET | Refills: 3 | Status: SHIPPED | OUTPATIENT
Start: 2018-02-12 | End: 2018-05-29 | Stop reason: SDUPTHER

## 2018-05-15 ENCOUNTER — APPOINTMENT (OUTPATIENT)
Dept: ONCOLOGY | Facility: CLINIC | Age: 63
End: 2018-05-15

## 2018-05-15 ENCOUNTER — APPOINTMENT (OUTPATIENT)
Dept: OTHER | Facility: HOSPITAL | Age: 63
End: 2018-05-15

## 2018-05-22 ENCOUNTER — LAB (OUTPATIENT)
Dept: OTHER | Facility: HOSPITAL | Age: 63
End: 2018-05-22

## 2018-05-22 ENCOUNTER — OFFICE VISIT (OUTPATIENT)
Dept: ONCOLOGY | Facility: CLINIC | Age: 63
End: 2018-05-22

## 2018-05-22 VITALS
OXYGEN SATURATION: 99 % | WEIGHT: 208 LBS | SYSTOLIC BLOOD PRESSURE: 139 MMHG | DIASTOLIC BLOOD PRESSURE: 82 MMHG | BODY MASS INDEX: 30.81 KG/M2 | RESPIRATION RATE: 16 BRPM | TEMPERATURE: 98.9 F | HEIGHT: 69 IN | HEART RATE: 71 BPM

## 2018-05-22 DIAGNOSIS — C91.11 CHRONIC LYMPHOCYTIC LEUKEMIA OF B-CELL TYPE IN REMISSION (HCC): ICD-10-CM

## 2018-05-22 DIAGNOSIS — I70.90 ARTERIAL OCCLUSION DUE TO ARTERIOSCLEROSIS: ICD-10-CM

## 2018-05-22 DIAGNOSIS — C91.11 CHRONIC LYMPHOCYTIC LEUKEMIA OF B-CELL TYPE IN REMISSION (HCC): Primary | ICD-10-CM

## 2018-05-22 LAB
BASOPHILS # BLD AUTO: 0.04 10*3/MM3 (ref 0–0.2)
BASOPHILS NFR BLD AUTO: 0.7 % (ref 0–1.5)
DEPRECATED RDW RBC AUTO: 38.6 FL (ref 37–54)
EOSINOPHIL # BLD AUTO: 0.11 10*3/MM3 (ref 0–0.7)
EOSINOPHIL NFR BLD AUTO: 2 % (ref 0.3–6.2)
ERYTHROCYTE [DISTWIDTH] IN BLOOD BY AUTOMATED COUNT: 12.7 % (ref 11.5–14.5)
HCT VFR BLD AUTO: 43 % (ref 40.4–52.2)
HGB BLD-MCNC: 15 G/DL (ref 13.7–17.6)
IMM GRANULOCYTES # BLD: 0.05 10*3/MM3 (ref 0–0.03)
IMM GRANULOCYTES NFR BLD: 0.9 % (ref 0–0.5)
LYMPHOCYTES # BLD AUTO: 0.96 10*3/MM3 (ref 0.9–4.8)
LYMPHOCYTES NFR BLD AUTO: 17 % (ref 19.6–45.3)
MCH RBC QN AUTO: 29.6 PG (ref 27–32.7)
MCHC RBC AUTO-ENTMCNC: 34.9 G/DL (ref 32.6–36.4)
MCV RBC AUTO: 84.8 FL (ref 79.8–96.2)
MONOCYTES # BLD AUTO: 0.63 10*3/MM3 (ref 0.2–1.2)
MONOCYTES NFR BLD AUTO: 11.2 % (ref 5–12)
NEUTROPHILS # BLD AUTO: 3.85 10*3/MM3 (ref 1.9–8.1)
NEUTROPHILS NFR BLD AUTO: 68.2 % (ref 42.7–76)
NRBC BLD MANUAL-RTO: 0 /100 WBC (ref 0–0)
PLATELET # BLD AUTO: 254 10*3/MM3 (ref 140–500)
PMV BLD AUTO: 8.9 FL (ref 6–12)
RBC # BLD AUTO: 5.07 10*6/MM3 (ref 4.6–6)
WBC NRBC COR # BLD: 5.64 10*3/MM3 (ref 4.5–10.7)

## 2018-05-22 PROCEDURE — 85025 COMPLETE CBC W/AUTO DIFF WBC: CPT | Performed by: INTERNAL MEDICINE

## 2018-05-22 PROCEDURE — 36415 COLL VENOUS BLD VENIPUNCTURE: CPT

## 2018-05-22 PROCEDURE — 99213 OFFICE O/P EST LOW 20 MIN: CPT | Performed by: INTERNAL MEDICINE

## 2018-05-22 NOTE — PROGRESS NOTES
Subjective  1.Chronic lymphoid leukemia. As per TODAY, the patient’s leukemia has gone into remission after therapy with Rituxan/bendamustine.  This is documented by physical examination, CBC, chemistry profile, beta-2 microglobulin that is normal, and most importantly, peripheral blood flow cytometry that has documented resolution of his monoclonal population of B cells.  We advised him to remain in observation, returning to see us every 3 months.  No need for any other pathological assessment under the present circumstances.       2.Left subclavian ARTERIAL obstruction associated with symptomatology into the left upper extremity including claudication upon exercise, coldness and occasional numbness.  The patient has been seen by Dr. Stevan Hdz.      History of Present Illness      This patient is here today in order to reassess his chronic lymphoid leukemia that was treated with Rituxan and bendamustine in the past achieving a remission. He is here today with no B symptoms, no peripheral adenopathy, no fever, no chills, no infections, no autoimmunity, feeling extremely well. ECOG performance status 0. From the point of view of his arterial occlusion in the left subclavian artery no major symptomatology as long as he does not use his left upper extremity too much. No plans for him to have any surgery or stenting for this unless the clinical circumstances change. Otherwise the patient has not had any new health issues. Appetite is good, weight remains stable. All activity of urination remains normal. No cardiovascular or respiratory symptomatology. No rashes in the skin.         Past Medical History,   Past Medical History:   Diagnosis Date   • Cancer 2013    chronic lymphoid leukemia   • History of rotator cuff strain    • Hyperlipidemia    • Hypertension    • Reflux esophagitis    • Subclavian vein obstruction      Social History     Social History   • Marital status:      Spouse name: Sandra   • Number  of children: 2   • Years of education: College     Occupational History   •  [employer]     Social History Main Topics   • Smoking status: Former Smoker     Packs/day: 0.50     Years: 5.00     Types: Cigarettes     Quit date: 1/3/2016   • Smokeless tobacco: Former User   • Alcohol use No      Comment: daily   • Drug use: No   • Sexual activity: Yes     Partners: Female     Birth control/ protection: None     Other Topics Concern   • Not on file     Social History Narrative   • No narrative on file         Review of Systems   General: no fever, chills, fatigue, weight changes, or lack of appetite.  Eyes: no epiphora, xerophthalmia,conjunctivitis, pain, glaucoma, blurred vision, blindness, secretion, photophobia, proptosis, diplopia.  Ears: no otorrhea, tinnitus, otorrhagia, deafness, pain, vertigo.  Nose: no rhinorrhea, epistaxis, alteration in perception of odors, sinuses pressure.  Mouth: no alteration in gums or teeth,  ulcers, no difficulty with mastication or deglut ion, no odynophagia.  Neck: no masses or pain, no thyroid alterations, no pain in muscles or arteries, no carotid odynia, no crepitation.  Respiratory: no cough, sputum production, dyspnea, trepopnea, pleuritic pain, hemoptysis.  Heart: no syncope, irregularity, palpitations, angina, orthopnea, paroxysmal nocturnal dyspnea.  Vascular Venous: no tenderness,edema, palpable cords, postphlebitic syndrome, skin changes or ulcerations.  Vascular Arterial: no distal ischemia, claudication, gangrene, neuropathic ischemic pain, skin ulcers, paleness or cyanosis.  GI: no dysphagia, odynophagia, no regurgitation, no heartburn,no indigestion,no nausea,no vomiting,no hematemesis ,no melena,no jaundice,no distention, no obstipation,no enterorrhagia,no proctalgia,no anal  lesions, nochanges in bowel habits.  : no frequency, hesitancy, hematuria, discharge, pain.  Musculoskeletal: no muscle or tendon pain or inflammation, joint  "pain, edema, functional limitation, fasciculations, mass.  Neurologic: no headache, seizures, alterations on Craneal nerves, no motor or senssory deficit, normal coordination, no alteration in memory, orientation, calculation,writting, verbal or written language.  Skin: no rashes, pruritus or localized lesions.  Psychiatric: no anxiety, depression, agitation, delusions, proper insight.   .    Medications:  The current medication list was reviewed in the EMR    ALLERGIES:    Allergies   Allergen Reactions   • Ketoprofen Hives       Objective      Vitals:    05/22/18 0752   BP: 139/82   Pulse: 71   Resp: 16   Temp: 98.9 °F (37.2 °C)   TempSrc: Oral   SpO2: 99%   Weight: 94.3 kg (208 lb)   Height: 175 cm (68.9\")   PainSc: 0-No pain     Current Status 5/22/2018   ECOG score 0       Physical Exam    GENERAL:  Well-developed, well-nourished  Patient  in no acute distress.   SKIN:  Warm, dry without rashes, purpura or petechiae.  HEENT:  Pupils were equal and reactive to light and accomodation, conjunctivas non injected, no pterigion, normal extraocular movements, normal visual acuity.   Mouth mucosa was moist, no exudates in oropharynx, normal gum line, normal roof of the mouth and pillars, normal papillations of the tongue  NECK:  Supple with good range of motion; no thyromegaly or masses, no JVD or bruits, no cervical adenopathies.No carotid arteries pain, no carotid abnormal pulsation or arterial dance.  LYMPHATICS:  No cervical, supraclavicular, axillary, epitrochlear or inguinal adenopathy.  CHEST:  Normal excursion of both abbie thoraces, normal voice fremitus, no subcutaneous emphysema, normal axillas, no rashes or acanthosis nigricans. Lungs clear to percussion and auscultation, normal breath sounds bilaterally, no wheezing, crackles or ronchi, no stridor, no rubs.  CARDIAC AND VASCULAR:  normal rate and regular rhythm, without murmurs, rubs or S3 or S4 right or left sided gallops. Normal femoral, popliteal, pedis, " brachial and carotid pulses.  ABDOMEN:  Soft, nontender with no organomegaly or masses, no ascites, no collateral circulation,no distention,no [place] sign, no abdominal pain, no inguinal hernias,no umbilical hernias, no abdominal bruits. Normal bowel sounds.  GENITAL: Not  Performed.  EXTREMITIES  AND SPINE:  No clubbing, cyanosis or edema, no deformities or pain .No kyphosis, scoliosis, deformities or pain in spine, ribs or pelvic bone.  NEUROLOGICAL:  Patient was awake, alert, oriented to time, person and place,normal gait and coordination    RECENT LABS:  Hematology WBC   Date Value Ref Range Status   05/22/2018 5.64 4.50 - 10.70 10*3/mm3 Final     RBC   Date Value Ref Range Status   05/22/2018 5.07 4.60 - 6.00 10*6/mm3 Final     Hemoglobin   Date Value Ref Range Status   05/22/2018 15.0 13.7 - 17.6 g/dL Final     Hematocrit   Date Value Ref Range Status   05/22/2018 43.0 40.4 - 52.2 % Final     Platelets   Date Value Ref Range Status   05/22/2018 254 140 - 500 10*3/mm3 Final        Assessment/Plan   1.  This patient has had a very dramatic response to chemotherapy with bendamustine and Rituxan in the treatment of CLL. .  The patient has not had any obvious adenopathy; no hepatosplenomegaly, no B symptoms, no autoimmunity, no infections. .  His white count, hemoglobin and platelets have returned to normality.  The white count differential does not show any further lymphocytosis.  .  We propose a plan of care with just blood count every 6 months for the next year.  2.  Atherosclerotic obstruction of the left subclavian artery.  The patient is no longer smoking.  He is taking aspirin, atenolol and cholesterol medicine with dramatic improvement in his lipid profile.  Again, most importantly, the patient is no longer smoking.    3.  Review him back in 6 months with a CBC CMP AND LIPID PROFILE.  No need for radiological assessment at this time of any nature unless the clinical circumstances change.                          5/22/2018      CC:

## 2018-05-29 ENCOUNTER — OFFICE VISIT (OUTPATIENT)
Dept: FAMILY MEDICINE CLINIC | Facility: CLINIC | Age: 63
End: 2018-05-29

## 2018-05-29 VITALS
DIASTOLIC BLOOD PRESSURE: 80 MMHG | TEMPERATURE: 99 F | WEIGHT: 206 LBS | OXYGEN SATURATION: 99 % | HEART RATE: 84 BPM | HEIGHT: 69 IN | BODY MASS INDEX: 30.51 KG/M2 | SYSTOLIC BLOOD PRESSURE: 112 MMHG

## 2018-05-29 DIAGNOSIS — I10 ESSENTIAL HYPERTENSION: ICD-10-CM

## 2018-05-29 DIAGNOSIS — I77.1 STENOSIS OF LEFT SUBCLAVIAN ARTERY (HCC): ICD-10-CM

## 2018-05-29 DIAGNOSIS — E78.5 HYPERLIPIDEMIA, ACQUIRED: Primary | ICD-10-CM

## 2018-05-29 LAB
ALBUMIN SERPL-MCNC: 4.4 G/DL (ref 3.5–5.2)
ALBUMIN/GLOB SERPL: 2 G/DL
ALP SERPL-CCNC: 70 U/L (ref 39–117)
ALT SERPL-CCNC: 15 U/L (ref 1–41)
AST SERPL-CCNC: 13 U/L (ref 1–40)
BILIRUB SERPL-MCNC: 0.7 MG/DL (ref 0.1–1.2)
BUN SERPL-MCNC: 10 MG/DL (ref 8–23)
BUN/CREAT SERPL: 10.6 (ref 7–25)
CALCIUM SERPL-MCNC: 9.1 MG/DL (ref 8.6–10.5)
CHLORIDE SERPL-SCNC: 97 MMOL/L (ref 98–107)
CHOLEST SERPL-MCNC: 140 MG/DL (ref 0–200)
CO2 SERPL-SCNC: 22.5 MMOL/L (ref 22–29)
CREAT SERPL-MCNC: 0.94 MG/DL (ref 0.76–1.27)
GFR SERPLBLD CREATININE-BSD FMLA CKD-EPI: 81 ML/MIN/1.73
GFR SERPLBLD CREATININE-BSD FMLA CKD-EPI: 98 ML/MIN/1.73
GLOBULIN SER CALC-MCNC: 2.2 GM/DL
GLUCOSE SERPL-MCNC: 86 MG/DL (ref 65–99)
HDLC SERPL-MCNC: 58 MG/DL (ref 40–60)
LDLC SERPL CALC-MCNC: 66 MG/DL (ref 0–100)
POTASSIUM SERPL-SCNC: 4.1 MMOL/L (ref 3.5–5.2)
PROT SERPL-MCNC: 6.6 G/DL (ref 6–8.5)
SODIUM SERPL-SCNC: 138 MMOL/L (ref 136–145)
TRIGL SERPL-MCNC: 80 MG/DL (ref 0–150)
VLDLC SERPL CALC-MCNC: 16 MG/DL (ref 5–40)

## 2018-05-29 PROCEDURE — 99214 OFFICE O/P EST MOD 30 MIN: CPT | Performed by: FAMILY MEDICINE

## 2018-05-29 RX ORDER — ATENOLOL 25 MG/1
25 TABLET ORAL DAILY
Qty: 90 TABLET | Refills: 1 | Status: SHIPPED | OUTPATIENT
Start: 2018-05-29 | End: 2018-11-29 | Stop reason: SDUPTHER

## 2018-05-29 RX ORDER — SIMVASTATIN 40 MG
40 TABLET ORAL NIGHTLY
Qty: 90 TABLET | Refills: 1 | Status: SHIPPED | OUTPATIENT
Start: 2018-05-29 | End: 2018-10-04 | Stop reason: SDUPTHER

## 2018-05-29 NOTE — PROGRESS NOTES
Subjective   Endy Bagley is a 63 y.o. male. Previously seen by Dr. Russell. Pt is new to me, problems are new to me.      Chief Complaint   Patient presents with   • Establish Care     lipidemia    • Med Refill     atenolol    • Cough     since friday         History of Present Illness    Pt has hx of CLL well controlled at this time after chemo being monitored with CBC, H/O Dr. Roach, HLD, aterosclerosis, former smoker, and GERD. He has been evaluated by vascular and has artery occlusion for supply of the LUE. Can have stent but symptoms not significant at this time.    HLD  He takes and is tolerating simvastatin for years. Last lipid panel show good control.    HTN  He has been on atenolol for his blood pressure, was on 50 mg but so tired, lowered to 25 mg for this. Feels his BP is up gets flushed, red face. Happens when he gets stressed out at work. Has been well controlled over all since the decrease. The dose was decreased probably over on year ago. Tried at least 3 or 4 medication before he was put on the beta-blocker. Had significant side effects to these medications, palpitations, felt like heart was beating out of chest, just feeling bad.     Cough   Has been in bed for about 5 days. Hacking, sneezing, congested. No over the counter for relief, has had temp to 100. Feels it is starting to go down in to the chest. Coughing up clear secretions, no blood. He is having muscle aching and even his skin hurts, no rash. This has improved over the last day or so. So sick contacts. No GI symptoms. Eating and drinking ok. He used to have bad allergies, worse symptoms when he was a smoker. He quit about 1.5 years ago.     The following portions of the patient's history were reviewed and updated as appropriate: allergies, current medications, past family history, past medical history, past social history, past surgical history and problem list.    Review of Systems   Constitutional: Negative for activity change,  "appetite change and unexpected weight change.   HENT: Positive for congestion, postnasal drip, rhinorrhea, sinus pressure and sneezing.    Respiratory: Positive for cough. Negative for shortness of breath.    Cardiovascular: Positive for chest pain. Negative for leg swelling.        With cough, burning chest pain   Gastrointestinal: Negative for abdominal pain, constipation and diarrhea.   Musculoskeletal: Positive for myalgias. Negative for arthralgias.   Skin: Negative for rash.   Allergic/Immunologic: Positive for environmental allergies.   Psychiatric/Behavioral: Positive for sleep disturbance.       Objective   Blood pressure 112/80, pulse 84, temperature 99 °F (37.2 °C), height 175 cm (68.9\"), weight 93.4 kg (206 lb), SpO2 99 %.  Physical Exam   Constitutional: He is oriented to person, place, and time. He appears well-nourished. No distress.   HENT:   Mouth/Throat: Oropharynx is clear and moist.   Eyes: Conjunctivae are normal. Right eye exhibits no discharge. Left eye exhibits no discharge. No scleral icterus.   Neck: Neck supple. No thyromegaly present.   Cardiovascular: Normal rate, regular rhythm, normal heart sounds and intact distal pulses.  Exam reveals no gallop and no friction rub.    No murmur heard.  Pulmonary/Chest: Effort normal and breath sounds normal. No respiratory distress. He has no wheezes.   Musculoskeletal: He exhibits no edema.   Lymphadenopathy:     He has no cervical adenopathy.   Neurological: He is alert and oriented to person, place, and time.   Psychiatric: He has a normal mood and affect. His behavior is normal.   Vitals reviewed.      Assessment/Plan   Enyd was seen today for establish care, med refill and cough.    Diagnoses and all orders for this visit:    Hyperlipidemia, acquired  -     Lipid Panel  Has been well controlled on statin, but not checked in 2 years, will check today.  Essential hypertension  -     Comprehensive Metabolic Panel  Has been well controlled pt has " tried number of BP meds, would anticipate he has tried first line recommended medications, explained that beta blocker is not first line recommended. Voiced understanding but will stay on the medication at this time given his trial on other medication with significant intolerance.     Arterial occlusion due to arteriosclerosis  Monitored by vascular, no new symptoms. Seen in 2016 by Dr. eBnder for subclavian steel syndrome.   Other orders  -     atenolol (TENORMIN) 25 MG tablet; Take 1 tablet by mouth Daily.  -     simvastatin (ZOCOR) 40 MG tablet; Take 1 tablet by mouth Every Night.

## 2018-06-14 ENCOUNTER — TELEPHONE (OUTPATIENT)
Dept: FAMILY MEDICINE CLINIC | Facility: CLINIC | Age: 63
End: 2018-06-14

## 2018-06-29 RX ORDER — ATENOLOL 25 MG/1
TABLET ORAL
Qty: 90 TABLET | Refills: 1 | Status: SHIPPED | OUTPATIENT
Start: 2018-06-29 | End: 2018-11-29

## 2018-07-06 RX ORDER — SIMVASTATIN 40 MG
TABLET ORAL
Qty: 90 TABLET | Refills: 0 | Status: SHIPPED | OUTPATIENT
Start: 2018-07-06 | End: 2018-10-04 | Stop reason: SDUPTHER

## 2018-10-04 RX ORDER — SIMVASTATIN 40 MG
TABLET ORAL
Qty: 90 TABLET | Refills: 0 | Status: SHIPPED | OUTPATIENT
Start: 2018-10-04 | End: 2018-11-29 | Stop reason: SDUPTHER

## 2018-11-29 ENCOUNTER — OFFICE VISIT (OUTPATIENT)
Dept: FAMILY MEDICINE CLINIC | Facility: CLINIC | Age: 63
End: 2018-11-29

## 2018-11-29 ENCOUNTER — RESULTS ENCOUNTER (OUTPATIENT)
Dept: FAMILY MEDICINE CLINIC | Facility: CLINIC | Age: 63
End: 2018-11-29

## 2018-11-29 VITALS
WEIGHT: 203 LBS | OXYGEN SATURATION: 99 % | DIASTOLIC BLOOD PRESSURE: 72 MMHG | SYSTOLIC BLOOD PRESSURE: 130 MMHG | BODY MASS INDEX: 30.07 KG/M2 | HEART RATE: 78 BPM | HEIGHT: 69 IN

## 2018-11-29 DIAGNOSIS — Z23 ENCOUNTER FOR IMMUNIZATION: ICD-10-CM

## 2018-11-29 DIAGNOSIS — E78.5 HYPERLIPIDEMIA, ACQUIRED: ICD-10-CM

## 2018-11-29 DIAGNOSIS — I10 ESSENTIAL HYPERTENSION: ICD-10-CM

## 2018-11-29 DIAGNOSIS — Z12.11 COLON CANCER SCREENING: Primary | ICD-10-CM

## 2018-11-29 DIAGNOSIS — Z12.11 COLON CANCER SCREENING: ICD-10-CM

## 2018-11-29 PROCEDURE — 90674 CCIIV4 VAC NO PRSV 0.5 ML IM: CPT | Performed by: FAMILY MEDICINE

## 2018-11-29 PROCEDURE — 90471 IMMUNIZATION ADMIN: CPT | Performed by: FAMILY MEDICINE

## 2018-11-29 PROCEDURE — 99213 OFFICE O/P EST LOW 20 MIN: CPT | Performed by: FAMILY MEDICINE

## 2018-11-29 RX ORDER — ATENOLOL 25 MG/1
25 TABLET ORAL DAILY
Qty: 90 TABLET | Refills: 1 | Status: SHIPPED | OUTPATIENT
Start: 2018-11-29 | End: 2019-09-24 | Stop reason: SDUPTHER

## 2018-11-29 RX ORDER — SIMVASTATIN 40 MG
TABLET ORAL
Qty: 90 TABLET | Refills: 1 | Status: SHIPPED | OUTPATIENT
Start: 2018-11-29 | End: 2019-07-04 | Stop reason: SDUPTHER

## 2018-11-29 NOTE — PROGRESS NOTES
Subjective    Chief Complaint   Patient presents with   • Hypertension   • Immunizations     flu        Endy Bagley is a 63 y.o. male.     History of Present Illness   Obesity  Trying to be active, around the house, constant work there since moving. Watching what he eats and eating less. Has lost 20 lbs and wants to lose 20 more.     HLD  On the statin, losing weight, more active. Labs show good control from 5/2018. Will do at one year.     HTN  Good control, just taking atenolol low dose.   Encouraged to new increase in activity. Challenges with his new more sit down job.     The following portions of the patient's history were reviewed and updated as appropriate: allergies, current medications, past medical history, past social history, past surgical history and problem list.    Review of Systems   Constitutional: Negative.    HENT: Negative.    Eyes: Negative.    Respiratory: Negative.    Cardiovascular: Negative.    Gastrointestinal: Negative.    Endocrine: Negative.    Genitourinary: Negative.    Musculoskeletal: Negative.    Skin: Negative.    Allergic/Immunologic: Negative.    Neurological: Negative.    Hematological: Negative.    Psychiatric/Behavioral: Negative.        Objective    Vitals:    11/29/18 0941   BP: 130/72   Pulse: 78   SpO2: 99%       Physical Exam   Constitutional: He is oriented to person, place, and time. He appears well-nourished. No distress.   HENT:   Right Ear: External ear normal.   Left Ear: External ear normal.   Nose: Nose normal.   Mouth/Throat: Oropharynx is clear and moist. No oropharyngeal exudate.   Eyes: Conjunctivae and EOM are normal. Right eye exhibits no discharge. Left eye exhibits no discharge. No scleral icterus.   Neck: Neck supple. No thyromegaly present.   Cardiovascular: Normal rate, regular rhythm, normal heart sounds and intact distal pulses. Exam reveals no gallop and no friction rub.   No murmur heard.  Pulmonary/Chest: Effort normal and breath sounds  normal. No respiratory distress. He has no wheezes. He has no rales. He exhibits no tenderness.   Musculoskeletal: He exhibits no edema or deformity.   Lymphadenopathy:     He has no cervical adenopathy.   Neurological: He is alert and oriented to person, place, and time. He exhibits normal muscle tone. Coordination normal.   Skin: Skin is warm and dry.   Psychiatric: He has a normal mood and affect. His behavior is normal.   Vitals reviewed.      Assessment/Plan   Endy was seen today for hypertension and immunizations.    Diagnoses and all orders for this visit:    Colon cancer screening  -     Cologuard - Stool, Per Rectum; Future  Has not had colon cancer screening to date. He would really like to avoid colonoscopy. Wondering about alternatives. Discussed gold standard is colonoscopy because actual visulization of the colon but without FHx of colon cancer and pt not having any new bowel symptoms can do cologuard. There are false negatives and false positive results. If positive next step would be colonoscopy. Pt is agreeable.   Essential hypertension  Well controlled.   Hyperlipidemia, acquired  Well controlled.   Encounter for immunization  -     Flucelvax Quad=>4Years ([id number])    Other orders  -     atenolol (TENORMIN) 25 MG tablet; Take 1 tablet by mouth Daily.  -     simvastatin (ZOCOR) 40 MG tablet; Take one tablet nightly    Follow up in 6 months.

## 2018-12-10 ENCOUNTER — LAB (OUTPATIENT)
Dept: LAB | Facility: HOSPITAL | Age: 63
End: 2018-12-10

## 2018-12-10 ENCOUNTER — OFFICE VISIT (OUTPATIENT)
Dept: ONCOLOGY | Facility: CLINIC | Age: 63
End: 2018-12-10

## 2018-12-10 VITALS
RESPIRATION RATE: 14 BRPM | SYSTOLIC BLOOD PRESSURE: 130 MMHG | OXYGEN SATURATION: 97 % | HEIGHT: 68 IN | TEMPERATURE: 98.9 F | DIASTOLIC BLOOD PRESSURE: 80 MMHG | HEART RATE: 71 BPM | WEIGHT: 203.6 LBS | BODY MASS INDEX: 30.86 KG/M2

## 2018-12-10 DIAGNOSIS — I70.90 ARTERIAL OCCLUSION DUE TO ARTERIOSCLEROSIS: ICD-10-CM

## 2018-12-10 DIAGNOSIS — C91.11 CHRONIC LYMPHOCYTIC LEUKEMIA OF B-CELL TYPE IN REMISSION (HCC): Primary | ICD-10-CM

## 2018-12-10 DIAGNOSIS — C91.11 CHRONIC LYMPHOCYTIC LEUKEMIA OF B-CELL TYPE IN REMISSION (HCC): ICD-10-CM

## 2018-12-10 LAB
ALBUMIN SERPL-MCNC: 4.3 G/DL (ref 3.5–5.2)
ALBUMIN/GLOB SERPL: 2.2 G/DL (ref 1.1–2.4)
ALP SERPL-CCNC: 62 U/L (ref 38–116)
ALT SERPL W P-5'-P-CCNC: 21 U/L (ref 0–41)
ANION GAP SERPL CALCULATED.3IONS-SCNC: 9.9 MMOL/L
AST SERPL-CCNC: 22 U/L (ref 0–40)
BASOPHILS # BLD AUTO: 0.04 10*3/MM3 (ref 0–0.1)
BASOPHILS NFR BLD AUTO: 0.7 % (ref 0–1.1)
BILIRUB SERPL-MCNC: 0.7 MG/DL (ref 0.1–1.2)
BUN BLD-MCNC: 11 MG/DL (ref 6–20)
BUN/CREAT SERPL: 11.2 (ref 7.3–30)
CALCIUM SPEC-SCNC: 9.1 MG/DL (ref 8.5–10.2)
CHLORIDE SERPL-SCNC: 104 MMOL/L (ref 98–107)
CHOLEST SERPL-MCNC: 140 MG/DL (ref 0–200)
CO2 SERPL-SCNC: 26.1 MMOL/L (ref 22–29)
CREAT BLD-MCNC: 0.98 MG/DL (ref 0.7–1.3)
DEPRECATED RDW RBC AUTO: 41.2 FL (ref 37–49)
EOSINOPHIL # BLD AUTO: 0.1 10*3/MM3 (ref 0–0.36)
EOSINOPHIL NFR BLD AUTO: 1.7 % (ref 1–5)
ERYTHROCYTE [DISTWIDTH] IN BLOOD BY AUTOMATED COUNT: 12.6 % (ref 11.7–14.5)
GFR SERPL CREATININE-BSD FRML MDRD: 77 ML/MIN/1.73
GLOBULIN UR ELPH-MCNC: 2 GM/DL (ref 1.8–3.5)
GLUCOSE BLD-MCNC: 106 MG/DL (ref 74–124)
HCT VFR BLD AUTO: 45.5 % (ref 40–49)
HDLC SERPL-MCNC: 72 MG/DL (ref 40–60)
HGB BLD-MCNC: 15 G/DL (ref 13.5–16.5)
IMM GRANULOCYTES # BLD: 0.02 10*3/MM3 (ref 0–0.03)
IMM GRANULOCYTES NFR BLD: 0.3 % (ref 0–0.5)
LDLC SERPL CALC-MCNC: 57 MG/DL (ref 0–100)
LDLC/HDLC SERPL: 0.8 {RATIO}
LYMPHOCYTES # BLD AUTO: 0.94 10*3/MM3 (ref 1–3.5)
LYMPHOCYTES NFR BLD AUTO: 16 % (ref 20–49)
MCH RBC QN AUTO: 29.5 PG (ref 27–33)
MCHC RBC AUTO-ENTMCNC: 33 G/DL (ref 32–35)
MCV RBC AUTO: 89.6 FL (ref 83–97)
MONOCYTES # BLD AUTO: 0.6 10*3/MM3 (ref 0.25–0.8)
MONOCYTES NFR BLD AUTO: 10.2 % (ref 4–12)
NEUTROPHILS # BLD AUTO: 4.17 10*3/MM3 (ref 1.5–7)
NEUTROPHILS NFR BLD AUTO: 71.1 % (ref 39–75)
NRBC BLD MANUAL-RTO: 0 /100 WBC (ref 0–0)
PLATELET # BLD AUTO: 270 10*3/MM3 (ref 150–375)
PMV BLD AUTO: 8.4 FL (ref 8.9–12.1)
POTASSIUM BLD-SCNC: 4.6 MMOL/L (ref 3.5–4.7)
PROT SERPL-MCNC: 6.3 G/DL (ref 6.3–8)
RBC # BLD AUTO: 5.08 10*6/MM3 (ref 4.3–5.5)
SODIUM BLD-SCNC: 140 MMOL/L (ref 134–145)
TRIGL SERPL-MCNC: 53 MG/DL (ref 0–150)
VLDLC SERPL-MCNC: 10.6 MG/DL (ref 5–40)
WBC NRBC COR # BLD: 5.87 10*3/MM3 (ref 4–10)

## 2018-12-10 PROCEDURE — 36415 COLL VENOUS BLD VENIPUNCTURE: CPT

## 2018-12-10 PROCEDURE — 80061 LIPID PANEL: CPT | Performed by: INTERNAL MEDICINE

## 2018-12-10 PROCEDURE — 80053 COMPREHEN METABOLIC PANEL: CPT

## 2018-12-10 PROCEDURE — 85025 COMPLETE CBC W/AUTO DIFF WBC: CPT

## 2018-12-10 NOTE — PROGRESS NOTES
Subjective  1.Chronic lymphoid leukemia. As per TODAY, the patient’s leukemia has gone into remission after therapy with Rituxan/bendamustine.  This is documented by physical examination, CBC, chemistry profile, beta-2 microglobulin that is normal, and most importantly, peripheral blood flow cytometry that has documented resolution of his monoclonal population of B cells.  We advised him to remain in observation, returning to see us every 3 months.  No need for any other pathological assessment under the present circumstances.       2.Left subclavian ARTERIAL obstruction associated with symptomatology into the left upper extremity including claudication upon exercise, coldness and occasional numbness.  The patient has been seen by Dr. Stevan Hdz.      History of Present Illness      This patient is here today in order to reassess his chronic lymphoid leukemia that was treated with Rituxan and bendamustine in the past achieving a remission. He is here today with no B symptoms, no peripheral adenopathy, no fever, no chills, no infections, no autoimmunity, feeling extremely well. ECOG performance status 0. From the point of view of his arterial occlusion in the left subclavian artery no major symptomatology as long as he does not use his left upper extremity too much. No plans for him to have any surgery or stenting for this unless the clinical circumstances change. Otherwise the patient has not had any new health issues. Appetite is good, weight remains stable. All activity of urination remains normal. No cardiovascular or respiratory symptomatology. No rashes in the skin.         Past Medical History,   Past Medical History:   Diagnosis Date   • Cancer (CMS/HCC) 2013    chronic lymphoid leukemia   • History of rotator cuff strain    • Hyperlipidemia    • Hypertension    • Reflux esophagitis    • Subclavian vein obstruction (CMS/HCC)      Social History     Socioeconomic History   • Marital status:       Spouse name: Sandra   • Number of children: 2   • Years of education: College   • Highest education level: Not on file   Social Needs   • Financial resource strain: Not on file   • Food insecurity - worry: Not on file   • Food insecurity - inability: Not on file   • Transportation needs - medical: Not on file   • Transportation needs - non-medical: Not on file   Occupational History   • Occupation:      Employer: [employer]   Tobacco Use   • Smoking status: Former Smoker     Packs/day: 0.50     Years: 5.00     Pack years: 2.50     Types: Cigars     Last attempt to quit: 1/3/2016     Years since quittin.9   • Smokeless tobacco: Never Used   Substance and Sexual Activity   • Alcohol use: Yes     Comment: 2 cans of beer daily   • Drug use: No   • Sexual activity: Yes     Partners: Female     Birth control/protection: None   Other Topics Concern   • Not on file   Social History Narrative   • Not on file         Review of Systems     General: no fever, no chills, no fatigue,no weight changes, no lack of appetite.  Eyes: no epiphora, xerophthalmia,conjunctivitis, pain, glaucoma, blurred vision, blindness, secretion, photophobia, proptosis, diplopia.  Ears: no otorrhea, tinnitus, otorrhagia, deafness, pain, vertigo.  Nose: no rhinorrhea, no epistaxis, no alteration in perception of odors, no sinuses pressure.  Mouth: no alteration in gums or teeth,  No ulcers, no difficulty with mastication or deglut ion, no odynophagia.  Neck: no masses or pain, no thyroid alterations, no pain in muscles or arteries, no carotid odynia, no crepitation.  Respiratory: no cough, no sputum production,no dyspnea,no trepopnea, no pleuritic pain,no hemoptysis.  Heart: no syncope, no irregularity, no palpitations, no angina,no orthopnea,no paroxysmal nocturnal dyspnea.  Vascular Venous: no tenderness,no edema,no palpable cords,no postphlebitic syndrome, no skin changes no ulcerations.  Vascular Arterial: no  "distal ischemia, noclaudication, no gangrene, no neuropathic ischemic pain, no skin ulcers, no paleness no cyanosis.  GI: no dysphagia, no odynophagia, no regurgitation, no heartburn,no indigestion,no nausea,no vomiting,no hematemesis ,no melena,no jaundice,no distention, no obstipation,no enterorrhagia,no proctalgia,no anal  lesions, no changes in bowel habits.  : no frequency, no hesitancy, no hematuria, no discharge,no  pain.  Musculoskeletal: no muscle or tendon pain or inflammation,no  joint pain, no edema, no functional limitation,no fasciculations, no mass.  Neurologic: no headache, no seizures, noalterations on Craneal nerves, no motor deficit, no sensory deficit, normal coordination, no alteration in memory,normal orientation, calculation,normal writting, verbal and written language.  Skin: no rashes,no pruritus no localized lesions.  Psychiatric: no anxiety, no depression,no agitation, no delusions, proper insight.   .    Medications:  The current medication list was reviewed in the EMR    ALLERGIES:    Allergies   Allergen Reactions   • Ketoprofen Hives       Objective      Vitals:    12/10/18 0846   BP: 130/80   Pulse: 71   Resp: 14   Temp: 98.9 °F (37.2 °C)   TempSrc: Oral   SpO2: 97%   Weight: 92.4 kg (203 lb 9.6 oz)   Height: 173 cm (68.11\")   PainSc: 0-No pain     Current Status 12/10/2018   ECOG score 0       Physical Exam      GENERAL:  Well-developed, well-nourished  Patient  in no acute distress.   SKIN:  Warm, dry ,NO rashes,NO purpura ,NO petechiae.  HEENT:  Pupils were equal and reactive to light and accomodation, conjunctivas non injected, no pterigion, normal extraocular movements, normal visual acuity.   Mouth mucosa was moist, no exudates in oropharynx, normal gum line, normal roof of the mouth and pillars, normal papillations of the tongue.  NECK:  Supple with good range of motion; no thyromegaly or masses, no JVD or bruits, no cervical adenopathies.No carotid arteries pain, no carotid " abnormal pulsation , NO arterial dance.  LYMPHATICS:  No cervical, NO supraclavicular, NO axillary,NO epitrochlear , NO inguinal adenopathy.  CHEST:  Normal excursion of both abbie thoraces, normal voice fremitus, no subcutaneous emphysema, normal axillas, no rashes or acanthosis nigricans. Lungs clear to percussion and auscultation, normal breath sounds bilaterally, no wheezing,NO crackles NO ronchi, NO stridor, NO rubs.  CARDIAC AND VASCULAR:  normal rate and regular rhythm, without murmurs,NO rubs NO S3 NO S4 right or left . Normal femoral, popliteal, pedis, brachial and carotid pulses.  ABDOMEN:  Soft, nontender with no organomegaly or masses, no ascites, no collateral circulation,no distention,no Ogden sign, no abdominal pain, no inguinal hernias,no umbilical hernia, no abdominal bruits. Normal bowel sounds.  GENITAL: Not  Performed.  EXTREMITIES  AND SPINE:  No clubbing, cyanosis or edema, no deformities or pain .No kyphosis, scoliosis, deformities or pain in spine, ribs or pelvic bone.decreased left axillary pulse  NEUROLOGICAL:  Patient was awake, alert, oriented to time, person and place.    RECENT LABS:  Hematology WBC   Date Value Ref Range Status   12/10/2018 5.87 4.00 - 10.00 10*3/mm3 Final     RBC   Date Value Ref Range Status   12/10/2018 5.08 4.30 - 5.50 10*6/mm3 Final     Hemoglobin   Date Value Ref Range Status   12/10/2018 15.0 13.5 - 16.5 g/dL Final     Hematocrit   Date Value Ref Range Status   12/10/2018 45.5 40.0 - 49.0 % Final     Platelets   Date Value Ref Range Status   12/10/2018 270 150 - 375 10*3/mm3 Final        Assessment/Plan   1. This patient has history of chronic lymphoid leukemia that required therapy after progressive elevation of his white count and he was treated with bendamustine and Rituxan achieving a complete remission that he is keeping at this point. His white count, hemoglobin and white count differential today is completely normal. He has no peripheral adenopathy or  hepatosplenomegaly. He has not had any repetitive or persistent infections and he has not had any autoimmunity. His ECOG performance status is 0. His physical exam today is normal. This condition will remain in observation for the time being without the need for any other radiological assessment and scheduling followup in 6 months.  2. Need for skin cancer prevention in background of chronic CLL. The patient is aware of what to do and the timing for doing this.   3. The patient will proceed with collection of Cologuard by his primary physician.  4. The patient is up to date in regard to his vaccinations.    I will review him back in 6 months.                         12/10/2018      CC:

## 2018-12-10 NOTE — PROGRESS NOTES
Subjective  1.Chronic lymphoid leukemia. As per TODAY, the patient’s leukemia has gone into remission after therapy with Rituxan/bendamustine.  This is documented by physical examination, CBC, chemistry profile, beta-2 microglobulin that is normal, and most importantly, peripheral blood flow cytometry that has documented resolution of his monoclonal population of B cells.  We advised him to remain in observation, returning to see us every 3 months.  No need for any other pathological assessment under the present circumstances.       2.Left subclavian ARTERIAL obstruction associated with symptomatology into the left upper extremity including claudication upon exercise, coldness and occasional numbness.  The patient has been seen by Dr. Stevan Hdz.      History of Present Illness              Past Medical History,   Past Medical History:   Diagnosis Date   • Cancer (CMS/[facility])     chronic lymphoid leukemia   • History of rotator cuff strain    • Hyperlipidemia    • Hypertension    • Reflux esophagitis    • Subclavian vein obstruction (CMS/[facility])      Social History     Socioeconomic History   • Marital status:      Spouse name: Sandra   • Number of children: 2   • Years of education: College   • Highest education level: Not on file   Social Needs   • Financial resource strain: Not on file   • Food insecurity - worry: Not on file   • Food insecurity - inability: Not on file   • Transportation needs - medical: Not on file   • Transportation needs - non-medical: Not on file   Occupational History   • Occupation:      Employer: [employer]   Tobacco Use   • Smoking status: Former Smoker     Packs/day: 0.50     Years: 5.00     Pack years: 2.50     Types: Cigars     Last attempt to quit: 1/3/2016     Years since quittin.9   • Smokeless tobacco: Never Used   Substance and Sexual Activity   • Alcohol use: Yes     Comment: 2 cans of beer daily   • Drug use: No   • Sexual activity:  Yes     Partners: Female     Birth control/protection: None   Other Topics Concern   • Not on file   Social History Narrative   • Not on file         Review of Systems     General: no fever, no chills, no fatigue,no weight changes, no lack of appetite.  Eyes: no epiphora, xerophthalmia,conjunctivitis, pain, glaucoma, blurred vision, blindness, secretion, photophobia, proptosis, diplopia.  Ears: no otorrhea, tinnitus, otorrhagia, deafness, pain, vertigo.  Nose: no rhinorrhea, no epistaxis, no alteration in perception of odors, no sinuses pressure.  Mouth: no alteration in gums or teeth,  No ulcers, no difficulty with mastication or deglut ion, no odynophagia.  Neck: no masses or pain, no thyroid alterations, no pain in muscles or arteries, no carotid odynia, no crepitation.  Respiratory: no cough, no sputum production,no dyspnea,no trepopnea, no pleuritic pain,no hemoptysis.  Heart: no syncope, no irregularity, no palpitations, no angina,no orthopnea,no paroxysmal nocturnal dyspnea.  Vascular Venous: no tenderness,no edema,no palpable cords,no postphlebitic syndrome, no skin changes no ulcerations.  Vascular Arterial: no distal ischemia, noclaudication, no gangrene, no neuropathic ischemic pain, no skin ulcers, no paleness no cyanosis.  GI: no dysphagia, no odynophagia, no regurgitation, no heartburn,no indigestion,no nausea,no vomiting,no hematemesis ,no melena,no jaundice,no distention, no obstipation,no enterorrhagia,no proctalgia,no anal  lesions, no changes in bowel habits.  : no frequency, no hesitancy, no hematuria, no discharge,no  pain.  Musculoskeletal: no muscle or tendon pain or inflammation,no  joint pain, no edema, no functional limitation,no fasciculations, no mass.  Neurologic: no headache, no seizures, noalterations on Craneal nerves, no motor deficit, no sensory deficit, normal coordination, no alteration in memory,normal orientation, calculation,normal writting, verbal and written  "language.  Skin: no rashes,no pruritus no localized lesions.  Psychiatric: no anxiety, no depression,no agitation, no delusions, proper insight.      Medications:  The current medication list was reviewed in the EMR    ALLERGIES:    Allergies   Allergen Reactions   • Ketoprofen Hives       Objective      Vitals:    12/10/18 0846   BP: 130/80   Pulse: 71   Resp: 14   Temp: 98.9 °F (37.2 °C)   TempSrc: Oral   SpO2: 97%   Weight: 92.4 kg (203 lb 9.6 oz)   Height: 173 cm (68.11\")   PainSc: 0-No pain     Current Status 12/10/2018   ECOG score 0       Physical Exam        RECENT LABS:  Hematology WBC   Date Value Ref Range Status   05/22/2018 5.64 4.50 - 10.70 10*3/mm3 Final     RBC   Date Value Ref Range Status   05/22/2018 5.07 4.60 - 6.00 10*6/mm3 Final     Hemoglobin   Date Value Ref Range Status   05/22/2018 15.0 13.7 - 17.6 g/dL Final     Hematocrit   Date Value Ref Range Status   05/22/2018 43.0 40.4 - 52.2 % Final     Platelets   Date Value Ref Range Status   05/22/2018 254 140 - 500 10*3/mm3 Final        Assessment/Plan   1.  This patient has had a very dramatic response to chemotherapy with bendamustine and Rituxan in the treatment of CLL. .  The patient has not had any obvious adenopathy; no hepatosplenomegaly, no B symptoms, no autoimmunity, no infections. .  His white count, hemoglobin and platelets have returned to normality.  The white count differential does not show any further lymphocytosis.  .  We propose a plan of care with just blood count every 6 months for the next year.  2.  Atherosclerotic obstruction of the left subclavian artery.  The patient is no longer smoking.  He is taking aspirin, atenolol and cholesterol medicine with dramatic improvement in his lipid profile.  Again, most importantly, the patient is no longer smoking.    3.  Review him back in 6 months with a CBC CMP AND LIPID PROFILE.  No need for radiological assessment at this time of any nature unless the clinical circumstances " change.                         12/10/2018      CC:

## 2018-12-24 RX ORDER — ATENOLOL 25 MG/1
TABLET ORAL
Qty: 90 TABLET | Refills: 0 | Status: SHIPPED | OUTPATIENT
Start: 2018-12-24 | End: 2019-05-22 | Stop reason: SDUPTHER

## 2019-05-15 DIAGNOSIS — E78.5 HYPERLIPIDEMIA, ACQUIRED: ICD-10-CM

## 2019-05-15 DIAGNOSIS — C91.11 CHRONIC LYMPHOCYTIC LEUKEMIA OF B-CELL TYPE IN REMISSION (HCC): ICD-10-CM

## 2019-05-15 DIAGNOSIS — R79.89 ABNORMAL CBC: ICD-10-CM

## 2019-05-15 DIAGNOSIS — I10 ESSENTIAL HYPERTENSION: Primary | ICD-10-CM

## 2019-05-16 LAB
ALBUMIN SERPL-MCNC: 4.2 G/DL (ref 3.5–5.2)
ALBUMIN/GLOB SERPL: 1.9 G/DL
ALP SERPL-CCNC: 66 U/L (ref 39–117)
ALT SERPL-CCNC: 24 U/L (ref 1–41)
AST SERPL-CCNC: 21 U/L (ref 1–40)
BILIRUB SERPL-MCNC: 0.3 MG/DL (ref 0.2–1.2)
BUN SERPL-MCNC: 10 MG/DL (ref 8–23)
BUN/CREAT SERPL: 10.1 (ref 7–25)
CALCIUM SERPL-MCNC: 9.8 MG/DL (ref 8.6–10.5)
CHLORIDE SERPL-SCNC: 104 MMOL/L (ref 98–107)
CHOLEST SERPL-MCNC: 144 MG/DL (ref 0–200)
CO2 SERPL-SCNC: 28.2 MMOL/L (ref 22–29)
CREAT SERPL-MCNC: 0.99 MG/DL (ref 0.76–1.27)
GLOBULIN SER CALC-MCNC: 2.2 GM/DL
GLUCOSE SERPL-MCNC: 112 MG/DL (ref 65–99)
HDLC SERPL-MCNC: 71 MG/DL (ref 40–60)
LDLC SERPL CALC-MCNC: 64 MG/DL (ref 0–100)
POTASSIUM SERPL-SCNC: 4.8 MMOL/L (ref 3.5–5.2)
PROT SERPL-MCNC: 6.4 G/DL (ref 6–8.5)
SODIUM SERPL-SCNC: 143 MMOL/L (ref 136–145)
TRIGL SERPL-MCNC: 43 MG/DL (ref 0–150)
VLDLC SERPL CALC-MCNC: 8.6 MG/DL

## 2019-05-22 ENCOUNTER — OFFICE VISIT (OUTPATIENT)
Dept: FAMILY MEDICINE CLINIC | Facility: CLINIC | Age: 64
End: 2019-05-22

## 2019-05-22 VITALS
WEIGHT: 214.5 LBS | OXYGEN SATURATION: 99 % | SYSTOLIC BLOOD PRESSURE: 112 MMHG | TEMPERATURE: 99.3 F | HEART RATE: 72 BPM | BODY MASS INDEX: 32.51 KG/M2 | RESPIRATION RATE: 18 BRPM | HEIGHT: 68 IN | DIASTOLIC BLOOD PRESSURE: 72 MMHG

## 2019-05-22 DIAGNOSIS — E78.5 HYPERLIPIDEMIA, ACQUIRED: ICD-10-CM

## 2019-05-22 DIAGNOSIS — Z12.11 SCREENING FOR COLON CANCER: ICD-10-CM

## 2019-05-22 DIAGNOSIS — I10 ESSENTIAL HYPERTENSION: ICD-10-CM

## 2019-05-22 DIAGNOSIS — Z00.00 ANNUAL PHYSICAL EXAM: Primary | ICD-10-CM

## 2019-05-22 PROCEDURE — 99396 PREV VISIT EST AGE 40-64: CPT | Performed by: FAMILY MEDICINE

## 2019-05-22 NOTE — PROGRESS NOTES
"Chief Complaint   Patient presents with   • Hyperlipidemia     6 mth follow        Subjective   Endy Bagley is a 64 y.o. male.     History of Present Illness   HLD  Cholesterol looking good. Labs prior to coming.    HTN  Well controlled he takes atenolol    Annual physical  He had elevated BG but he was not fasting.  Exercise he has not been exercising regularly but now he is related to his job change and he is sitting a lot more. 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  Diet he does not eat fast food, eats at home and not that much, he snacks on cookies.  He has the box at home for cologuard just has not done the test yet.   PSA declined, no FHx of prostate cancer.    The following portions of the patient's history were reviewed and updated as appropriate: allergies, current medications, past family history, past medical history, past social history, past surgical history and problem list.    Review of Systems   Respiratory: Negative.    Cardiovascular: Negative.    Neurological: Negative.        Vitals:    05/22/19 0908   BP: 112/72   BP Location: Left arm   Patient Position: Sitting   Cuff Size: Adult   Pulse: 72   Resp: 18   Temp: 99.3 °F (37.4 °C)   TempSrc: Oral   SpO2: 99%   Weight: 97.3 kg (214 lb 8 oz)   Height: 173 cm (68.11\")       Objective   Physical Exam   Constitutional: He is oriented to person, place, and time. He appears well-nourished. No distress.   HENT:   Right Ear: External ear normal.   Left Ear: External ear normal.   Nose: Nose normal.   Mouth/Throat: Oropharynx is clear and moist. No oropharyngeal exudate.   Bilateral ear canals and tympanic membranes appear normal.   Eyes: Conjunctivae and EOM are normal. Right eye exhibits no discharge. Left eye exhibits no discharge. No scleral icterus.   Neck: Neck supple. No thyromegaly present.   Cardiovascular: Normal rate, regular rhythm, normal heart sounds and intact distal pulses. Exam reveals no gallop and no friction " rub.   No murmur heard.  Pulmonary/Chest: Effort normal and breath sounds normal. No respiratory distress. He has no wheezes. He has no rales. He exhibits no tenderness.   Abdominal: Soft. Bowel sounds are normal. He exhibits no distension and no mass. There is no tenderness. There is no guarding.   Musculoskeletal: He exhibits no edema or deformity.   Lymphadenopathy:     He has no cervical adenopathy.   Neurological: He is alert and oriented to person, place, and time. He exhibits normal muscle tone. Coordination normal.   Skin: Skin is warm and dry.   Psychiatric: He has a normal mood and affect. His behavior is normal.   Vitals reviewed.      Assessment/Plan   Endy was seen today for hyperlipidemia.    Diagnoses and all orders for this visit:    Annual physical exam    Screening for colon cancer    Essential hypertension    Hyperlipidemia, acquired        Cologuard at home needs to do and says he will.  Will get Hep C screen next time with labs.   Recommend to see dentist. He has one just has not been seen recently.  Says there is not a lot of time for other things, he works a lot.   We discussed shingrix vaccine and it was recommended. His questions were answered.   Next year we can start pna series.    We discussed the importance of regular physical activity.  Cardiovascular activity for the equivalent of 30 minutes 5 days per week.  We also discussed the importance of healthy diet to avoid weight gain and sugar intolerance.  I recommend predominantly filling the diet with vegetables and lean meats followed by fruits and whole grains.  I also recommend overall avoiding processed foods.

## 2019-05-28 ENCOUNTER — OFFICE VISIT (OUTPATIENT)
Dept: ONCOLOGY | Facility: CLINIC | Age: 64
End: 2019-05-28

## 2019-05-28 ENCOUNTER — APPOINTMENT (OUTPATIENT)
Dept: LAB | Facility: HOSPITAL | Age: 64
End: 2019-05-28

## 2019-05-28 VITALS
TEMPERATURE: 98 F | SYSTOLIC BLOOD PRESSURE: 146 MMHG | BODY MASS INDEX: 32.21 KG/M2 | OXYGEN SATURATION: 97 % | HEART RATE: 70 BPM | HEIGHT: 68 IN | RESPIRATION RATE: 14 BRPM | WEIGHT: 212.5 LBS | DIASTOLIC BLOOD PRESSURE: 88 MMHG

## 2019-05-28 DIAGNOSIS — I77.1 STENOSIS OF LEFT SUBCLAVIAN ARTERY (HCC): ICD-10-CM

## 2019-05-28 DIAGNOSIS — C91.11 CHRONIC LYMPHOCYTIC LEUKEMIA OF B-CELL TYPE IN REMISSION (HCC): Primary | ICD-10-CM

## 2019-05-28 LAB
BASOPHILS # BLD AUTO: 0.05 10*3/MM3 (ref 0–0.2)
BASOPHILS NFR BLD AUTO: 0.9 % (ref 0–1.5)
DEPRECATED RDW RBC AUTO: 39.2 FL (ref 37–54)
EOSINOPHIL # BLD AUTO: 0.11 10*3/MM3 (ref 0–0.4)
EOSINOPHIL NFR BLD AUTO: 2 % (ref 0.3–6.2)
ERYTHROCYTE [DISTWIDTH] IN BLOOD BY AUTOMATED COUNT: 12.7 % (ref 12.3–15.4)
HCT VFR BLD AUTO: 44.7 % (ref 37.5–51)
HGB BLD-MCNC: 15.7 G/DL (ref 13–17.7)
IMM GRANULOCYTES # BLD AUTO: 0.02 10*3/MM3 (ref 0–0.05)
IMM GRANULOCYTES NFR BLD AUTO: 0.4 % (ref 0–0.5)
LYMPHOCYTES # BLD AUTO: 1.03 10*3/MM3 (ref 0.7–3.1)
LYMPHOCYTES NFR BLD AUTO: 18.6 % (ref 19.6–45.3)
MCH RBC QN AUTO: 30.3 PG (ref 26.6–33)
MCHC RBC AUTO-ENTMCNC: 35.1 G/DL (ref 31.5–35.7)
MCV RBC AUTO: 86.1 FL (ref 79–97)
MONOCYTES # BLD AUTO: 0.58 10*3/MM3 (ref 0.1–0.9)
MONOCYTES NFR BLD AUTO: 10.5 % (ref 5–12)
NEUTROPHILS # BLD AUTO: 3.76 10*3/MM3 (ref 1.7–7)
NEUTROPHILS NFR BLD AUTO: 67.6 % (ref 42.7–76)
NRBC BLD AUTO-RTO: 0 /100 WBC (ref 0–0.2)
PLATELET # BLD AUTO: 275 10*3/MM3 (ref 140–450)
PMV BLD AUTO: 8.9 FL (ref 6–12)
RBC # BLD AUTO: 5.19 10*6/MM3 (ref 4.14–5.8)
WBC NRBC COR # BLD: 5.55 10*3/MM3 (ref 3.4–10.8)

## 2019-05-28 PROCEDURE — 36416 COLLJ CAPILLARY BLOOD SPEC: CPT | Performed by: INTERNAL MEDICINE

## 2019-05-28 PROCEDURE — 99213 OFFICE O/P EST LOW 20 MIN: CPT | Performed by: INTERNAL MEDICINE

## 2019-05-28 PROCEDURE — 85025 COMPLETE CBC W/AUTO DIFF WBC: CPT | Performed by: INTERNAL MEDICINE

## 2019-05-28 NOTE — PROGRESS NOTES
Subjective  1.Chronic lymphoid leukemia. As per TODAY, the patient’s leukemia has gone into remission after therapy with Rituxan/bendamustine.  This is documented by physical examination, CBC, chemistry profile, beta-2 microglobulin that is normal, and most importantly, peripheral blood flow cytometry that has documented resolution of his monoclonal population of B cells.  We advised him to remain in observation, returning to see us every 3 months.  No need for any other pathological assessment under the present circumstances.       2.Left subclavian ARTERIAL obstruction associated with symptomatology into the left upper extremity including claudication upon exercise, coldness and occasional numbness.  The patient has been seen by Dr. Stevan Hdz.      History of Present Illness  This patient returns today to the office for followup. He is here today with no complaints whatsoever of anything with no infections, no fevers, no chills, no adenopathies. His appetite is excellent, his weight is stable, he has no pain and he has an ECOG performance status of 0. He is going to do a Cologuard testing for screening for colon cancer. He is not smoking. Stenosis of the left subclavian artery is not symptomatic at this time.              Past Medical History,   Past Medical History:   Diagnosis Date   • Cancer (CMS/HCC) 2013    chronic lymphoid leukemia   • History of rotator cuff strain    • Hyperlipidemia    • Hypertension    • Reflux esophagitis    • Subclavian vein obstruction (CMS/HCC)      Social History     Socioeconomic History   • Marital status:      Spouse name: Sandra   • Number of children: 2   • Years of education: College   • Highest education level: Not on file   Occupational History   • Occupation:      Employer: [employer]   Tobacco Use   • Smoking status: Former Smoker     Packs/day: 0.50     Years: 5.00     Pack years: 2.50     Types: Cigars     Last attempt to quit:  1/3/2016     Years since quitting: 3.4   • Smokeless tobacco: Never Used   Substance and Sexual Activity   • Alcohol use: Yes     Frequency: 4 or more times a week     Comment: 2 cans of beer daily   • Drug use: No   • Sexual activity: Yes     Partners: Female     Birth control/protection: None         Review of Systems       General: no fever, no chills, no fatigue,no weight changes, no lack of appetite.  Eyes: no epiphora, xerophthalmia,conjunctivitis, pain, glaucoma, blurred vision, blindness, secretion, photophobia, proptosis, diplopia.  Ears: no otorrhea, tinnitus, otorrhagia, deafness, pain, vertigo.  Nose: no rhinorrhea, no epistaxis, no alteration in perception of odors, no sinuses pressure.  Mouth: no alteration in gums or teeth,  No ulcers, no difficulty with mastication or deglut ion, no odynophagia.  Neck: no masses or pain, no thyroid alterations, no pain in muscles or arteries, no carotid odynia, no crepitation.  Respiratory: no cough, no sputum production,no dyspnea,no trepopnea, no pleuritic pain,no hemoptysis.  Heart: no syncope, no irregularity, no palpitations, no angina,no orthopnea,no paroxysmal nocturnal dyspnea.  Vascular Venous: no tenderness,no edema,no palpable cords,no postphlebitic syndrome, no skin changes no ulcerations.  Vascular Arterial: no distal ischemia, noclaudication, no gangrene, no neuropathic ischemic pain, no skin ulcers, no paleness no cyanosis.  GI: no dysphagia, no odynophagia, no regurgitation, no heartburn,no indigestion,no nausea,no vomiting,no hematemesis ,no melena,no jaundice,no distention, no obstipation,no enterorrhagia,no proctalgia,no anal  lesions, no changes in bowel habits.  : no frequency, no hesitancy, no hematuria, no discharge,no  pain.  Musculoskeletal: no muscle or tendon pain or inflammation,no  joint pain, no edema, no functional limitation,no fasciculations, no mass.  Neurologic: no headache, no seizures, noalterations on Craneal nerves, no motor  deficit, no sensory deficit, normal coordination, no alteration in memory,normal orientation, calculation,normal writting, verbal and written language.  Skin: no rashes,no pruritus no localized lesions.  Psychiatric: no anxiety, no depression,no agitation, no delusions, proper insight.     .    Medications:  The current medication list was reviewed in the EMR    ALLERGIES:    Allergies   Allergen Reactions   • Ketoprofen Hives       Objective      There were no vitals filed for this visit.  Current Status 12/10/2018   ECOG score 0       Physical Exam    GENERAL:  Well-developed, well-nourished  Patient  in no acute distress.   SKIN:  Warm, dry ,NO rashes,NO purpura ,NO petechiae.  HEENT:  Pupils were equal and reactive to light and accomodation, conjunctivas non injected, no pterigion, normal extraocular movements, normal visual acuity.   Mouth mucosa was moist, no exudates in oropharynx, normal gum line, normal roof of the mouth and pillars, normal papillations of the tongue.  NECK:  Supple with good range of motion; no thyromegaly or masses, no JVD or bruits, no cervical adenopathies.No carotid arteries pain, no carotid abnormal pulsation , NO arterial dance.  LYMPHATICS:  No cervical, NO supraclavicular, NO axillary,NO epitrochlear , NO inguinal adenopathy.  CHEST:  Normal excursion of both abbie thoraces, normal voice fremitus, no subcutaneous emphysema, normal axillas, no rashes or acanthosis nigricans. Lungs clear to percussion and auscultation, normal breath sounds bilaterally, no wheezing,NO crackles NO ronchi, NO stridor, NO rubs.  CARDIAC AND VASCULAR:  normal rate and regular rhythm, without murmurs,NO rubs NO S3 NO S4 right or left . Normal femoral, popliteal, pedis, brachial and carotid pulses.  ABDOMEN:  Soft, nontender with no organomegaly or masses, no ascites, no collateral circulation,no distention,no Marcelo sign, no abdominal pain, no inguinal hernias,no umbilical hernia, no abdominal bruits.  Normal bowel sounds.  GENITAL: Not  Performed.  EXTREMITIES  AND SPINE:  No clubbing, cyanosis or edema, no deformities or pain .No kyphosis, scoliosis, deformities or pain in spine, ribs or pelvic bone.  NEUROLOGICAL:  Patient was awake, alert, oriented to time, person and place.        RECENT LABS:  Hematology WBC   Date Value Ref Range Status   12/10/2018 5.87 4.00 - 10.00 10*3/mm3 Final     RBC   Date Value Ref Range Status   12/10/2018 5.08 4.30 - 5.50 10*6/mm3 Final     Hemoglobin   Date Value Ref Range Status   12/10/2018 15.0 13.5 - 16.5 g/dL Final     Hematocrit   Date Value Ref Range Status   12/10/2018 45.5 40.0 - 49.0 % Final     Platelets   Date Value Ref Range Status   12/10/2018 270 150 - 375 10*3/mm3 Final        Assessment/Plan   1. This patient has history of chronic lymphoid leukemia that required therapy after progressive elevation of his white count and he was treated with bendamustine and Rituxan achieving a complete remission that he is keeping at this point. His white count, hemoglobin and white count differential today is completely normal. He has no peripheral adenopathy or hepatosplenomegaly. He has not had any repetitive or persistent infections and he has not had any autoimmunity. His ECOG performance status is 0. His physical exam today is normal. This condition will remain in observation for the time being without the need for any other radiological assessment and scheduling followup in 6 months.  2. Need for skin cancer prevention in background of chronic CLL. The patient is aware of what to do and the timing for doing this.   3. The patient will proceed with collection of Cologuard by his primary physician.  4. The patient is up to date in regard to his vaccinations.    I will review him back in 6 months.                           5/28/2019      CC:

## 2019-07-05 RX ORDER — SIMVASTATIN 40 MG
TABLET ORAL
Qty: 90 TABLET | Refills: 0 | Status: SHIPPED | OUTPATIENT
Start: 2019-07-05 | End: 2019-10-09 | Stop reason: SDUPTHER

## 2019-08-30 ENCOUNTER — OFFICE VISIT (OUTPATIENT)
Dept: FAMILY MEDICINE CLINIC | Facility: CLINIC | Age: 64
End: 2019-08-30

## 2019-08-30 VITALS
SYSTOLIC BLOOD PRESSURE: 128 MMHG | HEART RATE: 65 BPM | DIASTOLIC BLOOD PRESSURE: 80 MMHG | RESPIRATION RATE: 13 BRPM | BODY MASS INDEX: 32.48 KG/M2 | TEMPERATURE: 98.9 F | OXYGEN SATURATION: 98 % | HEIGHT: 68 IN | WEIGHT: 214.3 LBS

## 2019-08-30 DIAGNOSIS — I10 ESSENTIAL HYPERTENSION: ICD-10-CM

## 2019-08-30 DIAGNOSIS — R23.2 FLUSHING: Primary | ICD-10-CM

## 2019-08-30 PROCEDURE — 99213 OFFICE O/P EST LOW 20 MIN: CPT | Performed by: FAMILY MEDICINE

## 2019-08-30 NOTE — PROGRESS NOTES
"Chief Complaint   Patient presents with   • Hypertension     Pt states has been having headaches and facial flushing, states not monitoring at home but has been feeling different lately        Subjective   Endy Bagley is a 64 y.o. male.     History of Present Illness   Flushing   New problem to me.   Past couple of months, not every day. This happened multiple times previously. Then it was associated with his blood pressure being high. He was started on atenolol 50 mg but dx with CLL, was feeling poorly most days, BP running low, reduced to 25 mg and has been doing great since then.   Flushing, red and hot, feels warm, just on his face/cheeks.   He thought maybe BP was going up again because before it was related to his high blood pressure.  He likes salt. No NSAIDs no black licorice.  Has stressful job, works 4:30 in the AM to 10 PM 5 days per week, snacks a lot on salty food. Drinks a lot of water.   Same as when he is working in the yard or exerting himself.     The following portions of the patient's history were reviewed and updated as appropriate: allergies, current medications, past medical history, past social history and problem list.    Review of Systems   Respiratory: Negative.    Cardiovascular: Negative.    Neurological: Positive for headaches.   rarely gets headaches. Does not get headache with flushing, no new headaches.     Vitals:    08/30/19 0827   BP: 128/80   BP Location: Left arm   Patient Position: Sitting   Cuff Size: Adult   Pulse: 65   Resp: 13   Temp: 98.9 °F (37.2 °C)   TempSrc: Oral   SpO2: 98%   Weight: 97.2 kg (214 lb 4.8 oz)   Height: 173.5 cm (68.31\")       Objective   Physical Exam   Constitutional: He is oriented to person, place, and time. He appears well-nourished. No distress.   Eyes: Conjunctivae are normal. Right eye exhibits no discharge. Left eye exhibits no discharge. No scleral icterus.   Cardiovascular: Normal rate, regular rhythm, normal heart sounds and intact distal " pulses. Exam reveals no gallop and no friction rub.   No murmur heard.  No carotid bruit bilaterally   Pulmonary/Chest: Effort normal and breath sounds normal. No respiratory distress. He has no wheezes.   Musculoskeletal: He exhibits no edema.   Neurological: He is alert and oriented to person, place, and time.   Psychiatric: He has a normal mood and affect. His behavior is normal.   Vitals reviewed.      Assessment/Plan   Endy was seen today for hypertension.    Diagnoses and all orders for this visit:    Flushing  -     Comprehensive Metabolic Panel  -     CBC & Differential  -     TSH  -     T4    Essential hypertension    BP is well controlled. I repeated and got 118/74  Same medications continue  He denies any OTCs  He should reduce salt and I told him 3 times per day he should get aways from his desk and work (when working from home) and walk for 5-7 minutes. He thinks this sounds good and really wants to exercise and lose weight again.   Continue to drink good amount of water. Less caffeine, less alcohol.   We will check labs. He will return with any concerns.

## 2019-08-31 LAB
ALBUMIN SERPL-MCNC: 4.4 G/DL (ref 3.5–5.2)
ALBUMIN/GLOB SERPL: 1.8 G/DL
ALP SERPL-CCNC: 65 U/L (ref 39–117)
ALT SERPL-CCNC: 24 U/L (ref 1–41)
AST SERPL-CCNC: 18 U/L (ref 1–40)
BASOPHILS # BLD AUTO: 0.05 10*3/MM3 (ref 0–0.2)
BASOPHILS NFR BLD AUTO: 1 % (ref 0–1.5)
BILIRUB SERPL-MCNC: 0.4 MG/DL (ref 0.2–1.2)
BUN SERPL-MCNC: 12 MG/DL (ref 8–23)
BUN/CREAT SERPL: 11.1 (ref 7–25)
CALCIUM SERPL-MCNC: 9.1 MG/DL (ref 8.6–10.5)
CHLORIDE SERPL-SCNC: 103 MMOL/L (ref 98–107)
CO2 SERPL-SCNC: 27 MMOL/L (ref 22–29)
CREAT SERPL-MCNC: 1.08 MG/DL (ref 0.76–1.27)
EOSINOPHIL # BLD AUTO: 0.07 10*3/MM3 (ref 0–0.4)
EOSINOPHIL NFR BLD AUTO: 1.3 % (ref 0.3–6.2)
ERYTHROCYTE [DISTWIDTH] IN BLOOD BY AUTOMATED COUNT: 13 % (ref 12.3–15.4)
GLOBULIN SER CALC-MCNC: 2.4 GM/DL
GLUCOSE SERPL-MCNC: 110 MG/DL (ref 65–99)
HCT VFR BLD AUTO: 49.9 % (ref 37.5–51)
HGB BLD-MCNC: 15.6 G/DL (ref 13–17.7)
IMM GRANULOCYTES # BLD AUTO: 0.02 10*3/MM3 (ref 0–0.05)
IMM GRANULOCYTES NFR BLD AUTO: 0.4 % (ref 0–0.5)
LYMPHOCYTES # BLD AUTO: 0.92 10*3/MM3 (ref 0.7–3.1)
LYMPHOCYTES NFR BLD AUTO: 17.5 % (ref 19.6–45.3)
MCH RBC QN AUTO: 29.2 PG (ref 26.6–33)
MCHC RBC AUTO-ENTMCNC: 31.3 G/DL (ref 31.5–35.7)
MCV RBC AUTO: 93.4 FL (ref 79–97)
MONOCYTES # BLD AUTO: 0.57 10*3/MM3 (ref 0.1–0.9)
MONOCYTES NFR BLD AUTO: 10.9 % (ref 5–12)
NEUTROPHILS # BLD AUTO: 3.62 10*3/MM3 (ref 1.7–7)
NEUTROPHILS NFR BLD AUTO: 68.9 % (ref 42.7–76)
NRBC BLD AUTO-RTO: 0 /100 WBC (ref 0–0.2)
PLATELET # BLD AUTO: 294 10*3/MM3 (ref 140–450)
POTASSIUM SERPL-SCNC: 4.4 MMOL/L (ref 3.5–5.2)
PROT SERPL-MCNC: 6.8 G/DL (ref 6–8.5)
RBC # BLD AUTO: 5.34 10*6/MM3 (ref 4.14–5.8)
SODIUM SERPL-SCNC: 140 MMOL/L (ref 136–145)
T4 SERPL-MCNC: 5.39 MCG/DL (ref 4.5–11.7)
TSH SERPL DL<=0.005 MIU/L-ACNC: 2.29 UIU/ML (ref 0.27–4.2)
WBC # BLD AUTO: 5.25 10*3/MM3 (ref 3.4–10.8)

## 2019-09-24 RX ORDER — ATENOLOL 25 MG/1
TABLET ORAL
Qty: 90 TABLET | Refills: 0 | Status: SHIPPED | OUTPATIENT
Start: 2019-09-24 | End: 2019-12-23

## 2019-10-09 RX ORDER — SIMVASTATIN 40 MG
TABLET ORAL
Qty: 90 TABLET | Refills: 0 | Status: SHIPPED | OUTPATIENT
Start: 2019-10-09 | End: 2020-01-08

## 2019-11-12 ENCOUNTER — LAB (OUTPATIENT)
Dept: LAB | Facility: HOSPITAL | Age: 64
End: 2019-11-12

## 2019-11-12 ENCOUNTER — OFFICE VISIT (OUTPATIENT)
Dept: ONCOLOGY | Facility: CLINIC | Age: 64
End: 2019-11-12

## 2019-11-12 VITALS
RESPIRATION RATE: 14 BRPM | BODY MASS INDEX: 32.51 KG/M2 | OXYGEN SATURATION: 97 % | HEART RATE: 71 BPM | DIASTOLIC BLOOD PRESSURE: 82 MMHG | TEMPERATURE: 99 F | SYSTOLIC BLOOD PRESSURE: 118 MMHG | WEIGHT: 214.5 LBS | HEIGHT: 68 IN

## 2019-11-12 DIAGNOSIS — I77.1 STENOSIS OF LEFT SUBCLAVIAN ARTERY (HCC): ICD-10-CM

## 2019-11-12 DIAGNOSIS — M76.892 TENDINITIS OF LEFT KNEE: ICD-10-CM

## 2019-11-12 DIAGNOSIS — C91.11 CHRONIC LYMPHOCYTIC LEUKEMIA OF B-CELL TYPE IN REMISSION (HCC): Primary | ICD-10-CM

## 2019-11-12 DIAGNOSIS — C91.11 CHRONIC LYMPHOCYTIC LEUKEMIA OF B-CELL TYPE IN REMISSION (HCC): ICD-10-CM

## 2019-11-12 LAB
BASOPHILS # BLD AUTO: 0.03 10*3/MM3 (ref 0–0.2)
BASOPHILS NFR BLD AUTO: 0.5 % (ref 0–1.5)
DEPRECATED RDW RBC AUTO: 39.8 FL (ref 37–54)
EOSINOPHIL # BLD AUTO: 0.05 10*3/MM3 (ref 0–0.4)
EOSINOPHIL NFR BLD AUTO: 0.8 % (ref 0.3–6.2)
ERYTHROCYTE [DISTWIDTH] IN BLOOD BY AUTOMATED COUNT: 12.6 % (ref 12.3–15.4)
HCT VFR BLD AUTO: 45.3 % (ref 37.5–51)
HGB BLD-MCNC: 15.6 G/DL (ref 13–17.7)
IMM GRANULOCYTES # BLD AUTO: 0.02 10*3/MM3 (ref 0–0.05)
IMM GRANULOCYTES NFR BLD AUTO: 0.3 % (ref 0–0.5)
LYMPHOCYTES # BLD AUTO: 0.98 10*3/MM3 (ref 0.7–3.1)
LYMPHOCYTES NFR BLD AUTO: 16.4 % (ref 19.6–45.3)
MCH RBC QN AUTO: 29.8 PG (ref 26.6–33)
MCHC RBC AUTO-ENTMCNC: 34.4 G/DL (ref 31.5–35.7)
MCV RBC AUTO: 86.6 FL (ref 79–97)
MONOCYTES # BLD AUTO: 0.59 10*3/MM3 (ref 0.1–0.9)
MONOCYTES NFR BLD AUTO: 9.9 % (ref 5–12)
NEUTROPHILS # BLD AUTO: 4.29 10*3/MM3 (ref 1.7–7)
NEUTROPHILS NFR BLD AUTO: 72.1 % (ref 42.7–76)
NRBC BLD AUTO-RTO: 0 /100 WBC (ref 0–0.2)
PLATELET # BLD AUTO: 282 10*3/MM3 (ref 140–450)
PMV BLD AUTO: 9 FL (ref 6–12)
RBC # BLD AUTO: 5.23 10*6/MM3 (ref 4.14–5.8)
WBC NRBC COR # BLD: 5.96 10*3/MM3 (ref 3.4–10.8)

## 2019-11-12 PROCEDURE — 36415 COLL VENOUS BLD VENIPUNCTURE: CPT

## 2019-11-12 PROCEDURE — 99214 OFFICE O/P EST MOD 30 MIN: CPT | Performed by: INTERNAL MEDICINE

## 2019-11-12 PROCEDURE — 85025 COMPLETE CBC W/AUTO DIFF WBC: CPT

## 2019-11-12 PROCEDURE — G0463 HOSPITAL OUTPT CLINIC VISIT: HCPCS | Performed by: INTERNAL MEDICINE

## 2019-11-12 NOTE — PROGRESS NOTES
Subjective  1.Chronic lymphoid leukemia. As per TODAY, the patient’s leukemia has gone into remission after therapy with Rituxan/bendamustine.  This is documented by physical examination, CBC, chemistry profile, beta-2 microglobulin that is normal, and most importantly, peripheral blood flow cytometry that has documented resolution of his monoclonal population of B cells.         2.Left subclavian ARTERIAL obstruction associated with symptomatology into the left upper extremity including claudication upon exercise, coldness and occasional numbness.  The patient has been seen by Dr. Stvean Hdz.      History of Present Illness    This patient returns today to the office for followup. He is here today complaining of pain in the posterior lateral aspect of the left knee in absence of any trauma with no local inflammation. No edema in the left lower extremity. He has not had any falls and he has not been doing anything strenuous to justify this. This is not new, this has happened before and diagnosis of tendonitis was made. Otherwise he has not had any claudication in the left upper extremity. His appetite is good, his weight is stable, bowel function and urination are normal. No infections, no clinical bleeding, no autoimmunity otherwise.               Past Medical History,   Past Medical History:   Diagnosis Date   • Cancer (CMS/[facility]) 2013    chronic lymphoid leukemia   • History of rotator cuff strain    • Hyperlipidemia    • Hypertension    • Reflux esophagitis    • Subclavian vein obstruction (CMS/[facility])      Social History     Socioeconomic History   • Marital status:      Spouse name: Sandra   • Number of children: 2   • Years of education: College   • Highest education level: Not on file   Occupational History   • Occupation:      Employer: [employer]   Tobacco Use   • Smoking status: Former Smoker     Packs/day: 0.50     Years: 5.00     Pack years: 2.50     Types: Cigars      Last attempt to quit: 1/3/2016     Years since quitting: 3.8   • Smokeless tobacco: Never Used   Substance and Sexual Activity   • Alcohol use: Yes     Frequency: 4 or more times a week     Comment: 2 cans of beer daily   • Drug use: No   • Sexual activity: Yes     Partners: Female     Birth control/protection: None         Review of Systems         General: no fever, no chills, no fatigue,no weight changes, no lack of appetite.  Eyes: no epiphora, xerophthalmia,conjunctivitis, pain, glaucoma, blurred vision, blindness, secretion, photophobia, proptosis, diplopia.  Ears: no otorrhea, tinnitus, otorrhagia, deafness, pain, vertigo.  Nose: no rhinorrhea, no epistaxis, no alteration in perception of odors, no sinuses pressure.  Mouth: no alteration in gums or teeth,  No ulcers, no difficulty with mastication or deglut ion, no odynophagia.  Neck: no masses or pain, no thyroid alterations, no pain in muscles or arteries, no carotid odynia, no crepitation.  Respiratory: no cough, no sputum production,no dyspnea,no trepopnea, no pleuritic pain,no hemoptysis.  Heart: no syncope, no irregularity, no palpitations, no angina,no orthopnea,no paroxysmal nocturnal dyspnea.  Vascular Venous: no tenderness,no edema,no palpable cords,no postphlebitic syndrome, no skin changes no ulcerations.  Vascular Arterial: no distal ischemia, noclaudication, no gangrene, no neuropathic ischemic pain, no skin ulcers, no paleness no cyanosis.  GI: no dysphagia, no odynophagia, no regurgitation, no heartburn,no indigestion,no nausea,no vomiting,no hematemesis ,no melena,no jaundice,no distention, no obstipation,no enterorrhagia,no proctalgia,no anal  lesions, no changes in bowel habits.  : no frequency, no hesitancy, no hematuria, no discharge,no  pain.  Musculoskeletal: STATED muscle or tendon pain or inflammation,no  joint pain, no edema, no functional limitation,no fasciculations, no mass.  Neurologic: no headache, no seizures,  "noalterations on Craneal nerves, no motor deficit, no sensory deficit, normal coordination, no alteration in memory,normal orientation, calculation,normal writting, verbal and written language.  Skin: no rashes,no pruritus no localized lesions.  Psychiatric: no anxiety, no depression,no agitation, no delusions, proper insight.     .    Medications:  The current medication list was reviewed in the EMR    ALLERGIES:    Allergies   Allergen Reactions   • Ketoprofen Hives       Objective      Vitals:    11/12/19 1009   BP: 118/82   Pulse: 71   Resp: 14   Temp: 99 °F (37.2 °C)   SpO2: 97%   Weight: 97.3 kg (214 lb 8 oz)   Height: 173.5 cm (68.31\")   PainSc: 5  Comment: pain behind the LT knee     Current Status 11/12/2019   ECOG score 0       Physical Exam    GENERAL:  Well-developed, well-nourished  Patient  in no acute distress.   SKIN:  Warm, dry ,NO rashes,NO purpura ,NO petechiae.  HEENT:  Pupils were equal and reactive to light and accomodation, conjunctivas non injected, no pterigion, normal extraocular movements, normal visual acuity.   Mouth mucosa was moist, no exudates in oropharynx, normal gum line, normal roof of the mouth and pillars, normal papillations of the tongue.  NECK:  Supple with good range of motion; no thyromegaly or masses, no JVD or bruits, no cervical adenopathies.No carotid arteries pain, no carotid abnormal pulsation , NO arterial dance.  LYMPHATICS:  No cervical, NO supraclavicular, NO axillary,NO epitrochlear , NO inguinal adenopathy.  CHEST:  Normal excursion of both abbie thoraces, normal voice fremitus, no subcutaneous emphysema, normal axillas, no rashes or acanthosis nigricans. Lungs clear to percussion and auscultation, normal breath sounds bilaterally, no wheezing,NO crackles NO ronchi, NO stridor, NO rubs.  CARDIAC AND VASCULAR:  normal rate and regular rhythm, without murmurs,NO rubs NO S3 NO S4 right or left . Normal femoral, popliteal, pedis, brachial and carotid " pulses.  ABDOMEN:  Soft, nontender with no organomegaly or masses, no ascites, no collateral circulation,no distention,no Braithwaite sign, no abdominal pain, no inguinal hernias,no umbilical hernia, no abdominal bruits. Normal bowel sounds.  GENITAL: Not  Performed.  EXTREMITIES  AND SPINE:  No clubbing, cyanosis or edema, no deformities or pain .No kyphosis, scoliosis, deformities or pain in spine, ribs or pelvic bone.TENDER LATERAL ASPECT POST KNEE TENDON APPARATUS ,NO BAKER CYST, NOCLOTH  NEUROLOGICAL:  Patient was awake, alert, oriented to time, person and place.                RECENT LABS:  Hematology WBC   Date Value Ref Range Status   11/12/2019 5.96 3.40 - 10.80 10*3/mm3 Final   08/30/2019 5.25 3.40 - 10.80 10*3/mm3 Final     RBC   Date Value Ref Range Status   11/12/2019 5.23 4.14 - 5.80 10*6/mm3 Final   08/30/2019 5.34 4.14 - 5.80 10*6/mm3 Final     Hemoglobin   Date Value Ref Range Status   11/12/2019 15.6 13.0 - 17.7 g/dL Final     Hematocrit   Date Value Ref Range Status   11/12/2019 45.3 37.5 - 51.0 % Final     Platelets   Date Value Ref Range Status   11/12/2019 282 140 - 450 10*3/mm3 Final        Assessment/Plan   1. This patient has history of chronic lymphoid leukemia that required therapy after progressive elevation of his white count and he was treated with bendamustine and Rituxan achieving a complete remission that he is keeping at this point. His white count, hemoglobin and white count differential today is completely normal. He has no peripheral adenopathy or hepatosplenomegaly. He has not had any repetitive or persistent infections and he has not had any autoimmunity. His ECOG performance status is 0. His physical exam today is normal. This condition will remain in observation for the time being without the need for any other radiological assessment and scheduling followup in 6 months.  2. Need for skin cancer prevention in background of chronic CLL.   3.The patient has a tendonitis in the left  knee. He has had this before. He is not doing anything strenuous or modifying the position of walking but I think this is happening again. There is no evidence of effusion in the knee, there is no Baker's cyst, there is no clots, there is proper pulses and I do believe antiinflammatory medication for 5 days in the form of Aleve 2 tablets twice a day with food besides Icy Hot and frozen pea bag at bedtime could be beneficial. If the symptoms do not improve I think he needs to be seen by orthopedic medicine and he will call if he needs a referral. Otherwise we will review him back in 6 months with a CBC.     From the point of view of his leukemia he remains in remission and I do not need to purse any other radiological or laboratory testing at this time.                                          11/12/2019      CC:

## 2019-11-18 ENCOUNTER — OFFICE VISIT (OUTPATIENT)
Dept: FAMILY MEDICINE CLINIC | Facility: CLINIC | Age: 64
End: 2019-11-18

## 2019-11-18 VITALS
DIASTOLIC BLOOD PRESSURE: 78 MMHG | BODY MASS INDEX: 32.55 KG/M2 | HEART RATE: 70 BPM | OXYGEN SATURATION: 98 % | SYSTOLIC BLOOD PRESSURE: 138 MMHG | TEMPERATURE: 97.9 F | WEIGHT: 214.8 LBS | HEIGHT: 68 IN

## 2019-11-18 DIAGNOSIS — J98.8 VIRAL RESPIRATORY ILLNESS: Primary | ICD-10-CM

## 2019-11-18 DIAGNOSIS — B97.89 VIRAL RESPIRATORY ILLNESS: Primary | ICD-10-CM

## 2019-11-18 PROCEDURE — 90471 IMMUNIZATION ADMIN: CPT | Performed by: FAMILY MEDICINE

## 2019-11-18 PROCEDURE — 90674 CCIIV4 VAC NO PRSV 0.5 ML IM: CPT | Performed by: FAMILY MEDICINE

## 2019-11-18 PROCEDURE — 99213 OFFICE O/P EST LOW 20 MIN: CPT | Performed by: FAMILY MEDICINE

## 2019-11-18 NOTE — PROGRESS NOTES
"Chief Complaint   Patient presents with   • Cough     productive with yellow sputum, wakes at night,        Subjective   Endy Bagley is a 64 y.o. male.     History of Present Illness   Cold symptoms  Started two days ago. He has cough, runny nose and phlegm.   Sometimes coughing stuff out. Looks yellow.   No fevers. He does feel like he has tightness in the chest, said that is how it started. Feels the same. He is coughing more. Said last night he had more coughing. Seems to be getting worse. Sick contacts at work.     The following portions of the patient's history were reviewed and updated as appropriate: allergies, current medications, past medical history, past social history and problem list.    Review of Systems   HENT: Positive for congestion.    Respiratory: Positive for cough.        /78 (BP Location: Left arm, Patient Position: Sitting, Cuff Size: Adult)   Pulse 70   Temp 97.9 °F (36.6 °C) (Oral)   Ht 173.5 cm (68.31\")   Wt 97.4 kg (214 lb 12.8 oz)   SpO2 98%   BMI 32.36 kg/m²       Objective   Physical Exam   Constitutional: He is oriented to person, place, and time. He appears well-nourished. No distress.   HENT:   Right Ear: External ear normal.   Left Ear: External ear normal.   Mouth/Throat: Oropharynx is clear and moist. No oropharyngeal exudate.   TMs retracted bilaterally but normal transparency, canal is clear. No drainage or discharge. Minimal effusion. There is clear nasal discharge and mild erythema of the nasal mucosa.    Eyes: Conjunctivae are normal. Right eye exhibits no discharge. Left eye exhibits no discharge. No scleral icterus.   Neck: Neck supple.   Cardiovascular: Normal rate, regular rhythm, normal heart sounds and intact distal pulses. Exam reveals no gallop and no friction rub.   No murmur heard.  Pulmonary/Chest: Effort normal and breath sounds normal. No respiratory distress. He has no wheezes.   Musculoskeletal: He exhibits no edema.   Lymphadenopathy:     He " has no cervical adenopathy.   Neurological: He is alert and oriented to person, place, and time.   Psychiatric: He has a normal mood and affect. His behavior is normal.   Vitals reviewed.      Assessment/Plan   Endy was seen today for cough.    Diagnoses and all orders for this visit:    Viral respiratory illness    Other orders  -     Fluarix/Fluzone/Afluria Quad>6 Months        He has only 2-3 days of symptoms. He complains of cough and congestion, cough getting worse. He says he had some tightness sensation in the chest related to the coughing. No wheeze or cough with deep inspiration on exam. Sounds clear. I explained given time table, contagious from work, all mean most likely at this point his illness is viral in etiology. No abx necessary. We will treat symptoms for relief. Given stahist samples and a symbicort sample. He was instructed on use and timing of meds. Treat symptoms. Drinking plenty of liquids warm and cold, cough drops, rest. He is going to call if not improving.

## 2019-11-25 ENCOUNTER — OFFICE VISIT (OUTPATIENT)
Dept: FAMILY MEDICINE CLINIC | Facility: CLINIC | Age: 64
End: 2019-11-25

## 2019-11-25 VITALS
HEIGHT: 68 IN | TEMPERATURE: 98.6 F | HEART RATE: 67 BPM | BODY MASS INDEX: 32.58 KG/M2 | OXYGEN SATURATION: 100 % | SYSTOLIC BLOOD PRESSURE: 132 MMHG | DIASTOLIC BLOOD PRESSURE: 80 MMHG | RESPIRATION RATE: 20 BRPM | WEIGHT: 215 LBS

## 2019-11-25 DIAGNOSIS — J20.8 ACUTE BRONCHITIS, BACTERIAL: Primary | ICD-10-CM

## 2019-11-25 DIAGNOSIS — B96.89 ACUTE BRONCHITIS, BACTERIAL: Primary | ICD-10-CM

## 2019-11-25 PROCEDURE — 99213 OFFICE O/P EST LOW 20 MIN: CPT | Performed by: NURSE PRACTITIONER

## 2019-11-25 RX ORDER — BENZONATATE 200 MG/1
200 CAPSULE ORAL 3 TIMES DAILY PRN
Qty: 30 CAPSULE | Refills: 0 | Status: SHIPPED | OUTPATIENT
Start: 2019-11-25 | End: 2020-04-14

## 2019-11-25 RX ORDER — AZITHROMYCIN 250 MG/1
TABLET, FILM COATED ORAL
Qty: 6 TABLET | Refills: 0 | Status: SHIPPED | OUTPATIENT
Start: 2019-11-25 | End: 2019-12-10

## 2019-11-25 NOTE — PROGRESS NOTES
Subjective   Endy Bagley is a 64 y.o. male.     Chief Complaint   Patient presents with   • URI     saw dr hal gonzalez last week,       HPI  New patient to me.  Here for worsening cough.  Was here to see PCP 1 week ago and dx with viral URI.   Now feeling worse in general. Cough is productive of yellow sputum.  Is worse both day and night-cant get more than 2 hrs sleep at night due to cough.  Has had minimal relief with symbicort inhaler given.  Could not use stayhist sample given-made him feel weird and it caused his BP to go up. Not really taking anything else. Has felt feverish a day or 2 but did not check temp.  Multiple sick contacts. Sick co-worker with similar did not start to improve until he started abx.  Usually does not get better from this until he takes an abx. No asthma, COPD, recent tobacco use or secondhand smoke exposure.     Social History     Tobacco Use   • Smoking status: Former Smoker     Packs/day: 0.50     Years: 5.00     Pack years: 2.50     Types: Cigars     Last attempt to quit: 1/3/2016     Years since quitting: 3.8   • Smokeless tobacco: Never Used   Substance Use Topics   • Alcohol use: Yes     Frequency: 4 or more times a week     Comment: 2 cans of beer daily   • Drug use: No       The following portions of the patient's history were reviewed and updated as appropriate: allergies, current medications, past family history, past medical history, past social history, past surgical history and problem list.    Review of Systems   Constitutional: Positive for fatigue.   HENT: Negative for congestion, ear discharge, ear pain (right ear is popping), rhinorrhea, sinus pressure, sinus pain and sore throat.    Eyes: Negative for discharge and itching.   Respiratory: Positive for cough and chest tightness. Negative for shortness of breath and wheezing.    Cardiovascular: Negative for chest pain and palpitations.   Gastrointestinal: Negative for abdominal pain, diarrhea, nausea and vomiting.  "  Musculoskeletal: Negative for arthralgias and myalgias.   Allergic/Immunologic: Negative for environmental allergies.   Neurological: Negative for weakness and headaches.   Hematological: Negative for adenopathy.       Objective   Blood pressure 132/80, pulse 67, temperature 98.6 °F (37 °C), resp. rate 20, height 173.5 cm (68.31\"), weight 97.5 kg (215 lb), SpO2 100 %.  Body mass index is 32.39 kg/m².    Physical Exam   Constitutional: He is oriented to person, place, and time. He appears well-developed and well-nourished. No distress.   HENT:   Head: Normocephalic and atraumatic.   Right Ear: External ear and ear canal normal. Tympanic membrane is not erythematous. A middle ear effusion is present.   Left Ear: Tympanic membrane, external ear and ear canal normal. Tympanic membrane is not erythematous.  No middle ear effusion.   Nose: Right sinus exhibits no maxillary sinus tenderness and no frontal sinus tenderness. Left sinus exhibits no maxillary sinus tenderness and no frontal sinus tenderness.   Mouth/Throat: Oropharynx is clear and moist.   Eyes: Conjunctivae are normal. Right eye exhibits no discharge. Left eye exhibits no discharge.   Neck: Neck supple.   Cardiovascular: Normal rate and regular rhythm.   Pulmonary/Chest: Effort normal and breath sounds normal.   Abdominal: Soft. Bowel sounds are normal. There is no tenderness.   Musculoskeletal: He exhibits no deformity.   Gait smooth and steady   Lymphadenopathy:     He has no cervical adenopathy.   Neurological: He is alert and oriented to person, place, and time.   Skin: Skin is warm and dry. He is not diaphoretic.   Psychiatric: He has a normal mood and affect.   Nursing note and vitals reviewed.      Assessment   Problem List Items Addressed This Visit     None      Visit Diagnoses     Acute bronchitis, bacterial    -  Primary    Relevant Medications    azithromycin (ZITHROMAX Z-THOM) 250 MG tablet    benzonatate (TESSALON) 200 MG capsule         "   Procedures           Impression and Plan:  Here with 1 week of cough which has now worsened.  Will start abx per patient request and give tessalon for cough prn.  He can continue symbicort if he finds it helpful as previously directed.  Signs and symptoms of complications from bronchitis reviewed.  Normal course and expected resolution reviewed. Patient has been instructed to complete all antibiotics, even if feeling better. Call with any problems taking them.     We discussed s/s requiring emergent tx.           Health Maintenance Due   Topic Date Due   • ZOSTER VACCINE (1 of 2) 02/25/2005   • HEPATITIS C SCREENING  07/08/2016   • COLONOSCOPY  07/08/2016              EMR Dragon/Transcription disclaimer:   Much of this encounter note is an electronic transcription/translation of spoken language to printed text. The electronic translation of spoken language may permit erroneous, or at times, nonsensical words or phrases to be inadvertently transcribed; Although I have reviewed the note for such errors, some may still exist.

## 2019-12-10 ENCOUNTER — OFFICE VISIT (OUTPATIENT)
Dept: FAMILY MEDICINE CLINIC | Facility: CLINIC | Age: 64
End: 2019-12-10

## 2019-12-10 VITALS
DIASTOLIC BLOOD PRESSURE: 80 MMHG | SYSTOLIC BLOOD PRESSURE: 120 MMHG | RESPIRATION RATE: 13 BRPM | HEART RATE: 74 BPM | WEIGHT: 214.7 LBS | BODY MASS INDEX: 32.54 KG/M2 | HEIGHT: 68 IN | OXYGEN SATURATION: 98 % | TEMPERATURE: 98 F

## 2019-12-10 DIAGNOSIS — J01.90 SUBACUTE SINUSITIS, UNSPECIFIED LOCATION: Primary | ICD-10-CM

## 2019-12-10 PROCEDURE — 99213 OFFICE O/P EST LOW 20 MIN: CPT | Performed by: FAMILY MEDICINE

## 2019-12-10 RX ORDER — AMOXICILLIN 875 MG/1
875 TABLET, COATED ORAL 2 TIMES DAILY
Qty: 14 TABLET | Refills: 0 | Status: SHIPPED | OUTPATIENT
Start: 2019-12-10 | End: 2020-04-14 | Stop reason: SDUPTHER

## 2019-12-10 NOTE — PROGRESS NOTES
"Chief Complaint   Patient presents with   • Cough   • URI       Subjective   Endy Bagley is a 64 y.o. male.     History of Present Illness   Sinuses  Cough and congestion. Hx of symptoms that were present then resolved then recurred then worsened he was here and saw NP, got an abx and cough medicine. This helped and he had some improvement and now he is worse again. Feels like the abx helps and symptoms don't resolve without it.    The following portions of the patient's history were reviewed and updated as appropriate: allergies, current medications, past medical history, past social history and problem list.    Review of Systems   HENT: Positive for congestion, sinus pressure, sinus pain and sore throat.    Respiratory: Positive for cough.        /80 (BP Location: Right arm, Patient Position: Sitting, Cuff Size: Adult)   Pulse 74   Temp 98 °F (36.7 °C) (Oral)   Resp 13   Ht 173.5 cm (68.31\")   Wt 97.4 kg (214 lb 11.2 oz)   SpO2 98%   BMI 32.35 kg/m²       Objective   Physical Exam   Constitutional: He appears well-nourished. No distress.   HENT:   Right Ear: External ear normal.   Left Ear: External ear normal.   Mouth/Throat: Oropharynx is clear and moist. No oropharyngeal exudate.   TMs and canals normal, minimal cerumen debris, non-occlusive. Nasal discharge, erythematous nasal mucosa.   Eyes: Right eye exhibits no discharge. Left eye exhibits no discharge. No scleral icterus.   Cardiovascular: Normal rate and normal heart sounds.   Pulmonary/Chest: Effort normal and breath sounds normal. No respiratory distress. He has no wheezes.   Vitals reviewed.      Assessment/Plan   Endy was seen today for cough and uri.    Diagnoses and all orders for this visit:    Subacute sinusitis, unspecified location    Other orders  -     amoxicillin (AMOXIL) 875 MG tablet; Take 1 tablet by mouth 2 (Two) Times a Day.               "

## 2019-12-23 RX ORDER — ATENOLOL 25 MG/1
TABLET ORAL
Qty: 90 TABLET | Refills: 0 | Status: SHIPPED | OUTPATIENT
Start: 2019-12-23 | End: 2020-03-24

## 2020-01-08 RX ORDER — SIMVASTATIN 40 MG
TABLET ORAL
Qty: 90 TABLET | Refills: 0 | Status: SHIPPED | OUTPATIENT
Start: 2020-01-08 | End: 2020-03-24

## 2020-02-28 ENCOUNTER — OFFICE VISIT (OUTPATIENT)
Dept: FAMILY MEDICINE CLINIC | Facility: CLINIC | Age: 65
End: 2020-02-28

## 2020-02-28 VITALS
HEART RATE: 74 BPM | HEIGHT: 69 IN | BODY MASS INDEX: 31.89 KG/M2 | RESPIRATION RATE: 18 BRPM | DIASTOLIC BLOOD PRESSURE: 84 MMHG | TEMPERATURE: 98.1 F | SYSTOLIC BLOOD PRESSURE: 132 MMHG | WEIGHT: 215.3 LBS | OXYGEN SATURATION: 98 %

## 2020-02-28 DIAGNOSIS — J20.9 ACUTE BRONCHITIS, UNSPECIFIED ORGANISM: Primary | ICD-10-CM

## 2020-02-28 DIAGNOSIS — C91.11 CHRONIC LYMPHOCYTIC LEUKEMIA OF B-CELL TYPE IN REMISSION (HCC): ICD-10-CM

## 2020-02-28 PROCEDURE — 99214 OFFICE O/P EST MOD 30 MIN: CPT | Performed by: NURSE PRACTITIONER

## 2020-02-28 RX ORDER — AZITHROMYCIN 250 MG/1
TABLET, FILM COATED ORAL
Qty: 6 TABLET | Refills: 0 | Status: SHIPPED | OUTPATIENT
Start: 2020-02-28 | End: 2020-04-14

## 2020-02-28 NOTE — PATIENT INSTRUCTIONS
Discharge instructions    Push fluids plenty rest  Mucinex DM for cough congestion  Saline nasal spray if needed menthol if needed    Symptomatic treatment for likely viral bronchitis  If worsening symptoms  Mild shortness of breath feverish some mild fatigue go ahead and start the antibiotic this weekend Zithromax and follow-up next week  Certainly any significant worsening chest pain shortness of breath high fever weakness  Emergency room for chest x-ray and further evaluation

## 2020-02-28 NOTE — PROGRESS NOTES
"Subjective   Endy Bagley is a 65 y.o. male.     Pleasant gentleman here today complains of cough congestion postnasal drip scratchy throat some mild body aches but no severe body aches no fevers or chills no increasing fatigue.  Symptoms moderate nothing makes better or worse.    Patient did get his flu shot  No recent travel    Past medical history CLL remission up-to-date with hematology oncology  No history of pneumonia no history of lung disease kidney disease    Social history no tobacco abuse  Area    Cough   Associated symptoms include ear pain and a sore throat. Pertinent negatives include no chest pain, chills, fever or shortness of breath.   Earache    Associated symptoms include coughing and a sore throat. Pertinent negatives include no abdominal pain.   Sore Throat    Associated symptoms include coughing and ear pain. Pertinent negatives include no abdominal pain, shortness of breath or trouble swallowing.        /84 (BP Location: Right arm, Patient Position: Sitting, Cuff Size: Adult)   Pulse 74   Temp 98.1 °F (36.7 °C) (Oral)   Resp 18   Ht 175.3 cm (69.02\")   Wt 97.7 kg (215 lb 4.8 oz)   SpO2 98%   BMI 31.78 kg/m²       The following portions of the patient's history were reviewed and updated as appropriate: allergies, current medications, past family history, past medical history, past social history, past surgical history and problem list.    Review of Systems   Constitutional: Negative for chills, diaphoresis, fatigue and fever.   HENT: Positive for ear pain and sore throat. Negative for trouble swallowing.    Eyes: Negative.  Negative for discharge.   Respiratory: Positive for cough. Negative for shortness of breath.    Cardiovascular: Negative for chest pain, palpitations and leg swelling.   Gastrointestinal: Negative.  Negative for abdominal pain.   Genitourinary: Negative.    Musculoskeletal: Negative.    Skin: Negative.    Neurological: Negative.  Negative for dizziness and " confusion.   Psychiatric/Behavioral: Negative.    All other systems reviewed and are negative.      Objective   Physical Exam   Constitutional: He is oriented to person, place, and time. He appears well-developed and well-nourished. No distress.   HENT:   Head: Normocephalic and atraumatic.   Nose: Nose normal.   Mouth/Throat: Oropharynx is clear and moist.   Eyes: Pupils are equal, round, and reactive to light. Conjunctivae are normal.   Neck: Neck supple. No JVD present.   Cardiovascular: Normal rate, regular rhythm and normal heart sounds.   No murmur heard.  Pulmonary/Chest: Effort normal and breath sounds normal. No respiratory distress. He has no wheezes.   Musculoskeletal: He exhibits no edema or tenderness.   Lymphadenopathy:     He has no cervical adenopathy.   Neurological: He is alert and oriented to person, place, and time.   Skin: Skin is warm and dry. He is not diaphoretic.   Psychiatric: He has a normal mood and affect. His behavior is normal. Judgment and thought content normal.   Vitals reviewed.        Assessment/Plan   Endy was seen today for cough, earache and sore throat.    Diagnoses and all orders for this visit:    Acute bronchitis, unspecified organism    Chronic lymphocytic leukemia of B-cell type in remission (CMS/HCC)    Other orders  -     azithromycin (ZITHROMAX Z-THOM) 250 MG tablet; Take 2 tablets the first day, then 1 tablet daily for 4 days.    Patient has viral symptoms  Unlikely flu although cannot rule out  Symptomatic treatment push fluids  Low threshold for further evaluation and treatment as he has a history of CLL  He was given Zithromax to take if he has any change in his condition  Certainly emergency room any significant changes or more severe changes                  Patient Instructions   Discharge instructions    Push fluids plenty rest  Mucinex DM for cough congestion  Saline nasal spray if needed menthol if needed    Symptomatic treatment for likely viral  bronchitis  If worsening symptoms  Mild shortness of breath feverish some mild fatigue go ahead and start the antibiotic this weekend Zithromax and follow-up next week  Certainly any significant worsening chest pain shortness of breath high fever weakness  Emergency room for chest x-ray and further evaluation

## 2020-03-23 ENCOUNTER — TELEPHONE (OUTPATIENT)
Dept: FAMILY MEDICINE CLINIC | Facility: CLINIC | Age: 65
End: 2020-03-23

## 2020-03-24 RX ORDER — SIMVASTATIN 40 MG
TABLET ORAL
Qty: 90 TABLET | Refills: 1 | Status: SHIPPED | OUTPATIENT
Start: 2020-03-24 | End: 2020-10-06

## 2020-03-24 RX ORDER — ATENOLOL 25 MG/1
TABLET ORAL
Qty: 90 TABLET | Refills: 1 | Status: SHIPPED | OUTPATIENT
Start: 2020-03-24 | End: 2020-09-20

## 2020-04-14 ENCOUNTER — TELEMEDICINE (OUTPATIENT)
Dept: FAMILY MEDICINE CLINIC | Facility: CLINIC | Age: 65
End: 2020-04-14

## 2020-04-14 DIAGNOSIS — J01.90 ACUTE SINUSITIS, RECURRENCE NOT SPECIFIED, UNSPECIFIED LOCATION: Primary | ICD-10-CM

## 2020-04-14 PROCEDURE — 99213 OFFICE O/P EST LOW 20 MIN: CPT | Performed by: FAMILY MEDICINE

## 2020-04-14 RX ORDER — AMOXICILLIN 875 MG/1
875 TABLET, COATED ORAL 2 TIMES DAILY
Qty: 14 TABLET | Refills: 0 | Status: SHIPPED | OUTPATIENT
Start: 2020-04-14 | End: 2020-12-04

## 2020-04-14 NOTE — PROGRESS NOTES
Subjective   Endy Bagley is a 65 y.o. male.     Chief Complaint   Patient presents with   • Nasal Congestion        History of Present Illness    Head congestion  Feels like it is happening again. He is having 10 days of symptoms at this point that are not improving.   He has a lot of head drainage. Nasal drainage, clogged and achy ears. He has a sore and scratchy throat. Coughing some but not intrustive, not keeping him up at night. Intermittent cough, occasionally productive.   He denies fever chills, SOB or trouble with breathing. Says appetite is pretty good. Things taste a little off, not the same.   He has continued to work during this time. He works construction, working outside most days. They wear gloves but not masks. They do try to practice social distancing when out. He is avoiding going out otherwise.   He was treated with amoxicillin in 12/2019 that worked well. Feels like this is not an extension of that illness but separate entirely.   He was also treated in the office by NP with azithromycin in 2/2020. This illness is also separate from that. He did recover completely from those.   He does not take any over the counters for his symptoms because he is worried about his BP going up.     The following portions of the patient's history were reviewed and updated as appropriate: allergies, current medications, past medical history, past social history and problem list.    Review of Systems   Constitutional: Negative for activity change, chills and fever.   HENT: Positive for congestion, ear pain, postnasal drip, rhinorrhea, sinus pressure and sore throat.    Respiratory: Positive for cough. Negative for chest tightness and shortness of breath.        Objective   There were no vitals taken for this visit.  Physical Exam  Not available. Pt could not connect.   Assessment/Plan   Endy was seen today for nasal congestion.    Diagnoses and all orders for this visit:    Acute sinusitis, recurrence not  specified, unspecified location    Amoxicillin 875 mg tabs ordered, BID for 7 days.     I think given the degree of symptoms and duration that it is appropriate to treat him for sinusitis. He seems to have gotten one about every other month and this is episode 3. We discussed using mucinex to help get out the drainage. Using nasal saline to wash and moisturize sinuses to help get out drainage as well. Also recommended anti-histamine like claritin, zyrtec or allegra, cautioned to avoid formulations with pseudophed this is what elevates BP.  Pt voices understanding and is agreeable to this plan.   Note to stay home for rest of the week.     Patient gave consent today for a telehealth video visit but only audio working as following recommendations of [place] governor and CDC during the COVID-19 pandemic.    10 min was spent in discussion with pt and greater than 50% of that time was spent counseling.

## 2020-05-12 ENCOUNTER — TELEPHONE (OUTPATIENT)
Dept: ONCOLOGY | Facility: CLINIC | Age: 65
End: 2020-05-12

## 2020-05-12 ENCOUNTER — APPOINTMENT (OUTPATIENT)
Dept: LAB | Facility: HOSPITAL | Age: 65
End: 2020-05-12

## 2020-05-12 NOTE — TELEPHONE ENCOUNTER
PT IS RETURNING LUANN'S CALL. DID NOT STATE WHY SHE WAS CALLING.  NOTICED PT HAD A VIDEO VISIT APPT WITH DR CIFUENTES TODAY.   HE SAID THAT APPT WAS SUPPOSE TO BE CANCELLED.    PLEASE GIVE BERNABE A CALL BACK [phone]

## 2020-05-13 NOTE — TELEPHONE ENCOUNTER
Spoke with patient and he stated he call on Friday and cancel the appointment but he was still in Dr. Roach schedule.

## 2020-05-18 ENCOUNTER — TELEPHONE (OUTPATIENT)
Dept: ONCOLOGY | Facility: CLINIC | Age: 65
End: 2020-05-18

## 2020-05-18 NOTE — TELEPHONE ENCOUNTER
Patient called billing and was told to call the office to get this charge resolved. patient is being charged for hospital room from 11/12/19 thru 11/30/19 and wants to know what the charge is for      Call patient back at [phone]

## 2020-07-20 NOTE — PROGRESS NOTES
Subjective:     Encounter Date:07/20/20        Patient ID: Carlo Archer is a 76 y.o. male.    Chief Complaint:  Atrial Fibrillation   Presents for follow-up visit. Symptoms are negative for chest pain, palpitations and shortness of breath. The symptoms have been stable. Past medical history includes atrial fibrillation and CAD.   Hypertension   This is a chronic problem. The current episode started more than 1 year ago. Pertinent negatives include no chest pain, palpitations or shortness of breath.   Coronary Artery Disease   Presents for follow-up visit. Pertinent negatives include no chest pain, chest pressure, chest tightness, palpitations or shortness of breath.     76-year-old gentleman presents today for reevaluation.  Clinically he continues to do well.  He faces an eye surgery.  He has not been in A. fib now for about 7 months.  Overall he says he is feeling very good no chest pains shortness of breath palpitations lightheadedness swelling or fatigue.  He continues to race cars.      Review of Systems   Cardiovascular: Negative for chest pain and palpitations.   Respiratory: Negative for chest tightness and shortness of breath.    All other systems reviewed and are negative.        ECG 12 Lead  Date/Time: 7/20/2020 11:42 AM  Performed by: Keo Montero MD  Authorized by: Keo Montero MD   Comparison: compared with previous ECG from 1/1/2020  Comparison to previous ECG: Was in atrial fibrillation now in sinus bradycardia  Rhythm: sinus bradycardia    Clinical impression: non-specific ECG               Objective:     Physical Exam   Constitutional: He is oriented to person, place, and time. He appears well-developed.   HENT:   Head: Normocephalic.   Eyes: Conjunctivae are normal.   Neck: Normal range of motion.   Cardiovascular: Normal rate, regular rhythm and normal heart sounds.   Pulmonary/Chest: Breath sounds normal.   Abdominal: Soft. Bowel sounds are normal.   Musculoskeletal: Normal    Subjective  Chronic lymphoid leukemia. As per 06/23/2016, the patient’s leukemia has gone into remission after therapy with Rituxan/bendamustine.  This is documented by physical examination, CBC, chemistry profile, beta-2 microglobulin that is normal, and most importantly, peripheral blood flow cytometry that has documented resolution of his monoclonal population of B cells.  We advised him to remain in observation, returning to see us every 3 months.  No need for any other pathological assessment under the present circumstances.       Left subclavian obstruction associated with symptomatology into the left upper extremity including claudication upon exercise, coldness and occasional numbness.  The patient has been seen by Dr. Stevan Hdz.      History of Present Illness      This patient is here today in order to reassess his chronic lymphoid leukemia that was treated with Rituxan and bendamustine in the past achieving a remission. He is here today with no B symptoms, no peripheral adenopathy, no fever, no chills, no infections, no autoimmunity, feeling extremely well. ECOG performance status 0. From the point of view of his arterial occlusion in the left subclavian artery no major symptomatology as long as he does not use his left upper extremity too much. No plans for him to have any surgery or stenting for this unless the clinical circumstances change. Otherwise the patient has not had any new health issues. Appetite is good, weight remains stable. All activity of urination remains normal. No cardiovascular or respiratory symptomatology. No rashes in the skin.         Past Medical History,   Past Medical History:   Diagnosis Date   • Cancer     chronic lymphoid leukemia   • History of rotator cuff strain    • Hyperlipidemia    • Hypertension    • Reflux esophagitis    • Subclavian vein obstruction      Social History     Social History   • Marital status:      Spouse name: N/A   • Number of children:  N/A   • Years of education: N/A     Occupational History   •  [employer] - teamwork across [employer]     Social History Main Topics   • Smoking status: Former Smoker     Packs/day: 0.50     Types: Cigarettes     Quit date: 1/3/2016   • Smokeless tobacco: Former User   • Alcohol use No      Comment: daily   • Drug use: Not on file   • Sexual activity: Yes     Partners: Female     Birth control/ protection: None     Other Topics Concern   • Not on file     Social History Narrative         Review of Systems    General: no fever, chills, fatigue, weight changes, or lack of appetite.  Eyes: no epiphora, conjunctivitis, pain, glaucoma, blurred vision, blindness, secretion, photophobia.  Ears: no otorrhea, tinnitus, otorrhagia, deafness, pain, vertigo.  Nose: no rhinorrhea, epistaxis, alteration in perception of odors, sinuses pressure.  Mouth: no alteration in gums ,, no difficulty with mastication or deglut ion, no odynophagia.  Neck: no masses or pain, no thyroid alterations, no pain in muscles or arteries, no carotid odynia, no crepitation.  Lungs: no cough, sputum production, dyspnea, trepopnea, pleuritic pain, hemoptysis.  Heart: no syncope, irregularity, palpitations, angina, orthopnea, paroxysmal nocturnal dyspnea.  GI: no dysphagia, odynophagia, no regurgitation, positive for heartburn and indigestion,no nausea, vomiting, hematemesis ,melena, jaundice, distention, obstipation, enterorrhagia, proctalgia, anal  Lesions, changes in bowel habits.  : no frequency, hesitancy, hematuria, discharge, pain.  Musculoskeletal: no muscle or tendon pain or inflammation, joint pain, edema, functional limitation, fasciculations, mass..  Neurologic: no headache, seizures, alterations on Craneal nerves, no motor or senssory deficit, normal coordination.  Skin: no rashes, pruritus or localized lesions.  Psychiatric: no anxiety, depression, agitation, delusions, proper insight.  .    Medications:  The current medication list was reviewed in  range of motion. He exhibits no edema.   Neurological: He is alert and oriented to person, place, and time.   Skin: Skin is warm and dry.   Psychiatric: He has a normal mood and affect. His behavior is normal.   Vitals reviewed.      Lab Review:       Assessment:          Diagnosis Plan   1. Coronary artery disease involving native coronary artery of native heart without angina pectoris     2. PAF (paroxysmal atrial fibrillation) (CMS/HCC)     3. Essential hypertension            Plan:       1.  Paroxysmal atrial  fibrillation.  Remains in sinus rhythm on anticoagulation.  2.  Coronary artery disease stable no issues  3.  Hypertension.  Blood pressure looks good.  4.  He did not get his sleep study due to the pandemic closing down the same clinics.  He is going get this rescheduled.  5.  He is low risk for eye surgery and he remains in normal sinus rhythm.  6.  Follow-up 1 year sooner if issues    Atrial Fibrillation and Atrial Flutter  Assessment  • The patient has paroxysmal atrial fibrillation  • This is non-valvular in etiology  • The patient's CHADS2-VASc score is 2  • A [id number]-FOLn score of 2 or more is considered a high risk for a thromboembolic event  • Apixaban prescribed    Plan  • Attempt to maintain sinus rhythm  • Continue aspirin for antithrombotic therapy, bleeding issues discussed  • Continue beta blocker for rhythm control      Coronary Artery Disease  Assessment  • The patient has no angina    Plan  • Lifestyle modifications discussed include adhering to a heart healthy diet, avoidance of tobacco products, maintenance of a healthy weight, medication compliance, regular exercise and regular monitoring of cholesterol and blood pressure    Subjective - Objective  • Current antiplatelet therapy includes aspirin 81 mg          "the EMR    ALLERGIES:    Allergies   Allergen Reactions   • Ketoprofen Hives       Objective      Vitals:    03/27/17 0853   BP: 142/76   Pulse: 73   Resp: 12   Temp: 98.6 °F (37 °C)   TempSrc: Oral   SpO2: 99%   Weight: 210 lb (95.3 kg)   Height: 69.29\" (176 cm)   PainSc: 0-No pain     Current Status 3/27/2017   ECOG score 0       Physical Exam    GENERAL:  Well-developed, well-nourished  Patient  in no acute distress.   SKIN:  Warm, dry without rashes, purpura or petechiae.  HEENT:  Pupils were equal and reactive to light and accomodation, conjunctivas non injected, no pterigion, normal extraocular movements, normal visual acuity.   Mouth mucosa was moist, no exudates in oropharynx, normal gum line, normal roof of the mouth and pillars, normal papillations of the tongue.SWOLLEN R SNEHA K WITH PAIN  NECK:  Supple with good range of motion; no thyromegaly or masses, no JVD or bruits, no cervical adenopathies.  LYMPHATICS:  No cervical, supraclavicular, axillary or inguinal adenopathy.  CHEST:  Lungs clear to percussion and auscultation, normal breath sounds bilaterally, no wheezing, crackles or ronchi, no stridor.  CARDIAC:  Regular rate and rhythm without murmurs, rubs or gallops.  ABDOMEN:  Soft, nontender with no organomegaly or masses, no ascites, no collateral circulation, no abdominal pain, no inguinal hernias, no abdominal bruits.  EXTREMITIES:  No clubbing, cyanosis or edema, no deformities or pain.Poor pulses and temperature in left upper extremity, obvious decrease in temperature, no paleness or cyanosis.  NEUROLOGICAL:  Patient was awake, alert, oriented to time, person and place,normal gait and coordination,     RECENT LABS:  Hematology WBC   Date Value Ref Range Status   03/27/2017 6.37 4.00 - 10.00 10*3/mm3 Final     RBC   Date Value Ref Range Status   03/27/2017 5.50 4.30 - 5.50 10*6/mm3 Final     Hemoglobin   Date Value Ref Range Status   03/27/2017 15.9 13.5 - 16.5 g/dL Final     Hematocrit   Date " Value Ref Range Status   03/27/2017 47.5 40.0 - 49.0 % Final     Platelets   Date Value Ref Range Status   03/27/2017 253 150 - 375 10*3/mm3 Final              Assessment/Plan   1.  This patient has had a very dramatic response to chemotherapy with bendamustine and Rituxan in the treatment of CLL. .  The patient has not had any obvious adenopathy; no hepatosplenomegaly, no B symptoms, no autoimmunity, no infections. .  His white count, hemoglobin and platelets have returned to normality.  The white count differential does not show any further lymphocytosis.  .  We propose a plan of care with just blood count every 3 months for the next year.  2.  Atherosclerotic obstruction of the left subclavian artery.  The patient is no longer smoking.  He is taking aspirin, atenolol and cholesterol medicine with dramatic improvement in his lipid profile.  Again, most importantly, the patient is no longer smoking.    3.  Review him back in 4 months with a CBC.  No need for radiological assessment at this time of any nature unless the clinical circumstances change.                          3/30/2017      CC:

## 2020-09-20 RX ORDER — ATENOLOL 25 MG/1
TABLET ORAL
Qty: 90 TABLET | Refills: 1 | Status: SHIPPED | OUTPATIENT
Start: 2020-09-20 | End: 2021-03-04 | Stop reason: SDUPTHER

## 2020-10-06 RX ORDER — SIMVASTATIN 40 MG
TABLET ORAL
Qty: 90 TABLET | Refills: 0 | Status: SHIPPED | OUTPATIENT
Start: 2020-10-06 | End: 2021-01-04

## 2020-12-04 ENCOUNTER — OFFICE VISIT (OUTPATIENT)
Dept: FAMILY MEDICINE CLINIC | Facility: CLINIC | Age: 65
End: 2020-12-04

## 2020-12-04 DIAGNOSIS — E78.5 HYPERLIPIDEMIA, ACQUIRED: ICD-10-CM

## 2020-12-04 DIAGNOSIS — I10 ESSENTIAL HYPERTENSION: Primary | ICD-10-CM

## 2020-12-04 PROCEDURE — 99441 PR PHYS/QHP TELEPHONE EVALUATION 5-10 MIN: CPT | Performed by: FAMILY MEDICINE

## 2020-12-04 RX ORDER — AMOXICILLIN 875 MG/1
875 TABLET, COATED ORAL 2 TIMES DAILY
Qty: 14 TABLET | Refills: 0 | Status: SHIPPED | OUTPATIENT
Start: 2020-12-04 | End: 2021-03-04

## 2020-12-04 NOTE — PROGRESS NOTES
Subjective   Endy Bagley is a 65 y.o. male.     Chief Complaint   Patient presents with   • Hypertension   • Hyperlipidemia        History of Present Illness    HTN  Does not take his BP readings at home. He does not have blurry vision. He often gets headaches but relates this to his allergies and sinuses, has runny nose a lot.     HLD  On a statin, taking regularly. No issues.   He is home a lot, mostly sedentary. 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     The following portions of the patient's history were reviewed and updated as appropriate: allergies, current medications, past family history, past medical history, past social history, past surgical history and problem list.      Review of Systems   Constitutional: Negative for activity change and fatigue.   HENT: Positive for rhinorrhea.    Eyes: Negative for visual disturbance.   Respiratory: Negative for shortness of breath.    Cardiovascular: Negative for chest pain, palpitations and leg swelling.   Neurological: Positive for headaches. Negative for light-headedness.       Objective   There were no vitals taken for this visit.  Physical Exam  Not available for this visit type.     Assessment/Plan   Diagnoses and all orders for this visit:    1. Essential hypertension (Primary)    2. Hyperlipidemia, acquired      He is doing well. He has no concerns today. He has no readings for me but says doing well. Last labs were 8/2019 but did have CBC last month. We talked about having him come in for any concerns but otherwise we will try to have him in for wellness early spring and have labs then.   He is going to pharmacy today to get flu shot and I advised him to get PPSV23 as well. He voiced understanding and agreed. We also talked about shingrix, he wants to get this as well but going to hold and get later date so not so many at one time.     Pt with couple of weeks of nasal congestion, often runs into issues with sinus infection. Going  to give abx just in case for him to use only as needed for worsening of symptoms.     Patient gave consent today for a telehealth telephone visit as following recommendations of our governor and CDC during the COVID-19 pandemic.      9 min was spent in discussion with pt and greater than 50% of that time was spent counseling.

## 2021-01-04 RX ORDER — SIMVASTATIN 40 MG
TABLET ORAL
Qty: 90 TABLET | Refills: 1 | Status: SHIPPED | OUTPATIENT
Start: 2021-01-04 | End: 2021-07-02

## 2021-03-04 ENCOUNTER — OFFICE VISIT (OUTPATIENT)
Dept: FAMILY MEDICINE CLINIC | Facility: CLINIC | Age: 66
End: 2021-03-04

## 2021-03-04 DIAGNOSIS — Z00.00 MEDICARE ANNUAL WELLNESS VISIT, INITIAL: Primary | ICD-10-CM

## 2021-03-04 DIAGNOSIS — Z11.59 ENCOUNTER FOR HEPATITIS C SCREENING TEST FOR LOW RISK PATIENT: ICD-10-CM

## 2021-03-04 DIAGNOSIS — E78.5 HYPERLIPIDEMIA, ACQUIRED: ICD-10-CM

## 2021-03-04 DIAGNOSIS — Z12.11 SCREENING FOR COLON CANCER: ICD-10-CM

## 2021-03-04 PROCEDURE — G0402 INITIAL PREVENTIVE EXAM: HCPCS | Performed by: FAMILY MEDICINE

## 2021-03-04 RX ORDER — ATENOLOL 25 MG/1
25 TABLET ORAL DAILY
Qty: 90 TABLET | Refills: 1 | Status: SHIPPED | OUTPATIENT
Start: 2021-03-04 | End: 2021-09-07

## 2021-03-04 NOTE — PROGRESS NOTES
The ABCs of the Annual Wellness Visit  Subsequent Medicare Wellness Visit    Chief Complaint   Patient presents with   • Medicare Wellness-subsequent       Subjective   History of Present Illness:  Endy Bagley is a 66 y.o. male who presents for a Subsequent Medicare Wellness Visit.    HEALTH RISK ASSESSMENT    Recent Hospitalizations:  No hospitalization(s) within the last year.    Current Medical Providers:  Patient Care Team:  Gina Bojorquez MD as PCP - General (Family Medicine)  Stephen Roach MD as Consulting Physician (Hematology and Oncology)  Teto Russell Jr., MD as Referring Physician (Family Medicine)    Smoking Status:  Social History     Tobacco Use   Smoking Status Former Smoker   • Packs/day: 0.50   • Years: 5.00   • Pack years: 2.50   • Types: Cigars   • Quit date: 1/3/2016   • Years since quittin.1   Smokeless Tobacco Never Used       Alcohol Consumption:  Social History     Substance and Sexual Activity   Alcohol Use Yes   • Frequency: 4 or more times a week    Comment: 2 cans of beer daily       Depression Screen:   PHQ-2/PHQ-9 Depression Screening 2019   Little interest or pleasure in doing things 0   Feeling down, depressed, or hopeless 0   Trouble falling or staying asleep, or sleeping too much -   Feeling tired or having little energy -   Poor appetite or overeating -   Feeling bad about yourself - or that you are a failure or have let yourself or your family down -   Trouble concentrating on things, such as reading the newspaper or watching television -   Moving or speaking so slowly that other people could have noticed. Or the opposite - being so fidgety or restless that you have been moving around a lot more than usual -   Thoughts that you would be better off dead, or of hurting yourself in some way -   Total Score 0       Fall Risk Screen:  STEADI Fall Risk Assessment has not been completed.    Health Habits and Functional and Cognitive Screening:  Functional &  Cognitive Status 3/4/2021   Do you have difficulty preparing food and eating? No   Do you have difficulty bathing yourself, getting dressed or grooming yourself? No   Do you have difficulty using the toilet? No   Do you have difficulty moving around from place to place? No   Do you have trouble with steps or getting out of a bed or a chair? No   Current Diet Well Balanced Diet   Dental Exam Not up to date   Eye Exam Not up to date   Exercise (times per week) 0 times per week   Current Exercises Include No Regular Exercise   Do you need help using the phone?  No   Are you deaf or do you have serious difficulty hearing?  No   Do you need help with transportation? No   Do you need help shopping? No   Do you need help preparing meals?  No   Do you need help with housework?  No   Do you need help with laundry? No   Do you need help taking your medications? No   Do you need help managing money? No   Do you ever drive or ride in a car without wearing a seat belt? No   Have you felt unusual stress, anger or loneliness in the last month? No   Who do you live with? Spouse   If you need help, do you have trouble finding someone available to you? No   Have you been bothered in the last four weeks by sexual problems? No   Do you have difficulty concentrating, remembering or making decisions? No         Does the patient have evidence of cognitive impairment? No    Asprin use counseling:Taking ASA appropriately as indicated    Age-appropriate Screening Schedule:  Refer to the list below for future screening recommendations based on patient's age, sex and/or medical conditions. Orders for these recommended tests are listed in the plan section. The patient has been provided with a written plan.    Health Maintenance   Topic Date Due   • COLONOSCOPY  1955   • ZOSTER VACCINE (1 of 2) 02/25/2005   • LIPID PANEL  05/15/2020   • TDAP/TD VACCINES (2 - Td) 08/14/2027   • INFLUENZA VACCINE  Completed          The following portions of  the patient's history were reviewed and updated as appropriate: allergies, current medications, past family history, past medical history, past social history, past surgical history and problem list.    Outpatient Medications Prior to Visit   Medication Sig Dispense Refill   • aspirin 81 MG tablet Take 81 mg by mouth daily.     • simvastatin (ZOCOR) 40 MG tablet TAKE ONE TABLET BY MOUTH ONCE NIGHTLY 90 tablet 1   • amoxicillin (AMOXIL) 875 MG tablet Take 1 tablet by mouth 2 (Two) Times a Day. 14 tablet 0   • atenolol (TENORMIN) 25 MG tablet TAKE ONE TABLET BY MOUTH DAILY 90 tablet 1     No facility-administered medications prior to visit.        Patient Active Problem List   Diagnosis   • Chronic lymphocytic leukemia of B-cell type (CMS/HCC)   • Stenosis of left subclavian artery (CMS/HCC)   • Hyperlipidemia, acquired   • Gastroesophageal reflux disease with esophagitis   • Essential hypertension   • Tendinitis of left knee       Advanced Care Planning:  ACP discussion was held with the patient during this visit. Patient does not have an advance directive, declines further assistance.    Review of Systems    Compared to one year ago, the patient feels his physical health is the same.  Compared to one year ago, the patient feels his mental health is the same.    Reviewed chart for potential of high risk medication in the elderly: yes  Reviewed chart for potential of harmful drug interactions in the elderly:yes    Objective       There were no vitals filed for this visit.    There is no height or weight on file to calculate BMI.  Discussed the patient's BMI with him. The BMI is above average; BMI management plan is completed.    Physical Exam  Constitutional:       General: He is not in acute distress.  Eyes:      General: No scleral icterus.     Conjunctiva/sclera: Conjunctivae normal.   Pulmonary:      Effort: Pulmonary effort is normal. No respiratory distress.   Neurological:      Mental Status: He is alert and  oriented to person, place, and time.   Psychiatric:         Behavior: Behavior normal.               Assessment/Plan   Medicare Risks and Personalized Health Plan  CMS Preventative Services Quick Reference  Advance Directive Discussion  Colon Cancer Screening  Immunizations Discussed/Encouraged (specific immunizations; Pneumococcal 23 and Shingrix )  Inactivity/Sedentary  Obesity/Overweight   Prostate Cancer Screening   Declines PSA  Says he did get pneumonia shot at the pharmacy and is supposed to return in one year and one day for the second pneumonia shot.   Does not have AD but talks about these with clients and knows about them, has documents and understands the utility.     The above risks/problems have been discussed with the patient.  Pertinent information has been shared with the patient in the After Visit Summary.  Follow up plans and orders are seen below in the Assessment/Plan Section.    Diagnoses and all orders for this visit:    1. Medicare annual wellness visit, initial (Primary)    2. Hyperlipidemia, acquired  -     CBC & Differential  -     Comprehensive Metabolic Panel  -     Lipid Panel    3. Encounter for hepatitis C screening test for low risk patient  -     Hepatitis C Antibody    4. Screening for colon cancer  -     Cologuard - Stool, Per Rectum; Future    Other orders  -     atenolol (TENORMIN) 25 MG tablet; Take 1 tablet by mouth Daily.  Dispense: 90 tablet; Refill: 1      Follow Up:  No follow-ups on file.     An After Visit Summary and PPPS were given to the patient.

## 2021-03-06 LAB
ALBUMIN SERPL-MCNC: 4.4 G/DL (ref 3.5–5.2)
ALBUMIN/GLOB SERPL: 1.8 G/DL
ALP SERPL-CCNC: 67 U/L (ref 39–117)
ALT SERPL-CCNC: 21 U/L (ref 1–41)
AST SERPL-CCNC: 21 U/L (ref 1–40)
BASOPHILS # BLD AUTO: 0.05 10*3/MM3 (ref 0–0.2)
BASOPHILS NFR BLD AUTO: 0.7 % (ref 0–1.5)
BILIRUB SERPL-MCNC: 0.7 MG/DL (ref 0–1.2)
BUN SERPL-MCNC: 9 MG/DL (ref 8–23)
BUN/CREAT SERPL: 9.3 (ref 7–25)
CALCIUM SERPL-MCNC: 9.3 MG/DL (ref 8.6–10.5)
CHLORIDE SERPL-SCNC: 104 MMOL/L (ref 98–107)
CHOLEST SERPL-MCNC: 147 MG/DL (ref 0–200)
CO2 SERPL-SCNC: 24.8 MMOL/L (ref 22–29)
CREAT SERPL-MCNC: 0.97 MG/DL (ref 0.76–1.27)
EOSINOPHIL # BLD AUTO: 0.08 10*3/MM3 (ref 0–0.4)
EOSINOPHIL NFR BLD AUTO: 1.1 % (ref 0.3–6.2)
ERYTHROCYTE [DISTWIDTH] IN BLOOD BY AUTOMATED COUNT: 12.4 % (ref 12.3–15.4)
GLOBULIN SER CALC-MCNC: 2.5 GM/DL
GLUCOSE SERPL-MCNC: 95 MG/DL (ref 65–99)
HCT VFR BLD AUTO: 47.7 % (ref 37.5–51)
HCV AB S/CO SERPL IA: <0.1 S/CO RATIO (ref 0–0.9)
HDLC SERPL-MCNC: 65 MG/DL (ref 40–60)
HGB BLD-MCNC: 16.4 G/DL (ref 13–17.7)
IMM GRANULOCYTES # BLD AUTO: 0.03 10*3/MM3 (ref 0–0.05)
IMM GRANULOCYTES NFR BLD AUTO: 0.4 % (ref 0–0.5)
LDLC SERPL CALC-MCNC: 71 MG/DL (ref 0–100)
LYMPHOCYTES # BLD AUTO: 1.49 10*3/MM3 (ref 0.7–3.1)
LYMPHOCYTES NFR BLD AUTO: 20.1 % (ref 19.6–45.3)
MCH RBC QN AUTO: 29.8 PG (ref 26.6–33)
MCHC RBC AUTO-ENTMCNC: 34.4 G/DL (ref 31.5–35.7)
MCV RBC AUTO: 86.6 FL (ref 79–97)
MONOCYTES # BLD AUTO: 0.62 10*3/MM3 (ref 0.1–0.9)
MONOCYTES NFR BLD AUTO: 8.4 % (ref 5–12)
NEUTROPHILS # BLD AUTO: 5.13 10*3/MM3 (ref 1.7–7)
NEUTROPHILS NFR BLD AUTO: 69.3 % (ref 42.7–76)
NRBC BLD AUTO-RTO: 0 /100 WBC (ref 0–0.2)
PLATELET # BLD AUTO: 313 10*3/MM3 (ref 140–450)
POTASSIUM SERPL-SCNC: 4.4 MMOL/L (ref 3.5–5.2)
PROT SERPL-MCNC: 6.9 G/DL (ref 6–8.5)
RBC # BLD AUTO: 5.51 10*6/MM3 (ref 4.14–5.8)
SODIUM SERPL-SCNC: 142 MMOL/L (ref 136–145)
TRIGL SERPL-MCNC: 51 MG/DL (ref 0–150)
VLDLC SERPL CALC-MCNC: 11 MG/DL (ref 5–40)
WBC # BLD AUTO: 7.4 10*3/MM3 (ref 3.4–10.8)

## 2021-03-19 ENCOUNTER — BULK ORDERING (OUTPATIENT)
Dept: CASE MANAGEMENT | Facility: OTHER | Age: 66
End: 2021-03-19

## 2021-03-19 DIAGNOSIS — Z23 IMMUNIZATION DUE: ICD-10-CM

## 2021-07-02 RX ORDER — SIMVASTATIN 40 MG
TABLET ORAL
Qty: 90 TABLET | Refills: 2 | Status: SHIPPED | OUTPATIENT
Start: 2021-07-02 | End: 2022-03-29

## 2021-09-07 RX ORDER — ATENOLOL 25 MG/1
TABLET ORAL
Qty: 90 TABLET | Refills: 1 | Status: SHIPPED | OUTPATIENT
Start: 2021-09-07 | End: 2022-03-03

## 2022-03-02 NOTE — TELEPHONE ENCOUNTER
Rx Refill Note  Requested Prescriptions     Pending Prescriptions Disp Refills   • atenolol (TENORMIN) 25 MG tablet [Pharmacy Med Name: ATENOLOL 25 MG TABLET] 90 tablet 1     Sig: TAKE ONE TABLET BY MOUTH DAILY      Last office visit with prescribing clinician: 3/4/2021      Next office visit with prescribing clinician: Visit date not found            Jennifer Huggins MA  03/02/22, 13:41 EST

## 2022-03-03 RX ORDER — ATENOLOL 25 MG/1
TABLET ORAL
Qty: 90 TABLET | Refills: 1 | Status: SHIPPED | OUTPATIENT
Start: 2022-03-03 | End: 2022-08-29

## 2022-03-11 ENCOUNTER — LAB (OUTPATIENT)
Dept: FAMILY MEDICINE CLINIC | Facility: CLINIC | Age: 67
End: 2022-03-11

## 2022-03-11 DIAGNOSIS — I10 ESSENTIAL HYPERTENSION: ICD-10-CM

## 2022-03-11 DIAGNOSIS — E78.5 HYPERLIPIDEMIA, ACQUIRED: Primary | ICD-10-CM

## 2022-03-11 DIAGNOSIS — E78.5 HYPERLIPIDEMIA, ACQUIRED: ICD-10-CM

## 2022-03-12 LAB
ALBUMIN SERPL-MCNC: 4.8 G/DL (ref 3.8–4.8)
ALBUMIN/GLOB SERPL: 2 {RATIO} (ref 1.2–2.2)
ALP SERPL-CCNC: 67 IU/L (ref 44–121)
ALT SERPL-CCNC: 21 IU/L (ref 0–44)
AST SERPL-CCNC: 19 IU/L (ref 0–40)
BASOPHILS # BLD AUTO: 0.1 X10E3/UL (ref 0–0.2)
BASOPHILS NFR BLD AUTO: 1 %
BILIRUB SERPL-MCNC: 0.7 MG/DL (ref 0–1.2)
BUN SERPL-MCNC: 9 MG/DL (ref 8–27)
BUN/CREAT SERPL: 9 (ref 10–24)
CALCIUM SERPL-MCNC: 9.5 MG/DL (ref 8.6–10.2)
CHLORIDE SERPL-SCNC: 104 MMOL/L (ref 96–106)
CHOLEST SERPL-MCNC: 151 MG/DL (ref 100–199)
CO2 SERPL-SCNC: 21 MMOL/L (ref 20–29)
CREAT SERPL-MCNC: 0.99 MG/DL (ref 0.76–1.27)
EGFR GENE MUT ANL BLD/T: 83 ML/MIN/1.73
EOSINOPHIL # BLD AUTO: 0.1 X10E3/UL (ref 0–0.4)
EOSINOPHIL NFR BLD AUTO: 2 %
ERYTHROCYTE [DISTWIDTH] IN BLOOD BY AUTOMATED COUNT: 12.1 % (ref 11.6–15.4)
GLOBULIN SER CALC-MCNC: 2.4 G/DL (ref 1.5–4.5)
GLUCOSE SERPL-MCNC: 96 MG/DL (ref 65–99)
HCT VFR BLD AUTO: 47.8 % (ref 37.5–51)
HDLC SERPL-MCNC: 71 MG/DL
HGB BLD-MCNC: 16.3 G/DL (ref 13–17.7)
IMM GRANULOCYTES # BLD AUTO: 0 X10E3/UL (ref 0–0.1)
IMM GRANULOCYTES NFR BLD AUTO: 0 %
LDLC SERPL CALC-MCNC: 70 MG/DL (ref 0–99)
LYMPHOCYTES # BLD AUTO: 1.9 X10E3/UL (ref 0.7–3.1)
LYMPHOCYTES NFR BLD AUTO: 25 %
MCH RBC QN AUTO: 29.5 PG (ref 26.6–33)
MCHC RBC AUTO-ENTMCNC: 34.1 G/DL (ref 31.5–35.7)
MCV RBC AUTO: 87 FL (ref 79–97)
MONOCYTES # BLD AUTO: 0.7 X10E3/UL (ref 0.1–0.9)
MONOCYTES NFR BLD AUTO: 8 %
NEUTROPHILS # BLD AUTO: 5 X10E3/UL (ref 1.4–7)
NEUTROPHILS NFR BLD AUTO: 64 %
PLATELET # BLD AUTO: 316 X10E3/UL (ref 150–450)
POTASSIUM SERPL-SCNC: 5 MMOL/L (ref 3.5–5.2)
PROT SERPL-MCNC: 7.2 G/DL (ref 6–8.5)
RBC # BLD AUTO: 5.52 X10E6/UL (ref 4.14–5.8)
SODIUM SERPL-SCNC: 143 MMOL/L (ref 134–144)
TRIGL SERPL-MCNC: 43 MG/DL (ref 0–149)
VLDLC SERPL CALC-MCNC: 10 MG/DL (ref 5–40)
WBC # BLD AUTO: 7.8 X10E3/UL (ref 3.4–10.8)

## 2022-03-15 ENCOUNTER — OFFICE VISIT (OUTPATIENT)
Dept: FAMILY MEDICINE CLINIC | Facility: CLINIC | Age: 67
End: 2022-03-15

## 2022-03-15 VITALS
HEART RATE: 66 BPM | BODY MASS INDEX: 30.62 KG/M2 | WEIGHT: 206.7 LBS | DIASTOLIC BLOOD PRESSURE: 74 MMHG | SYSTOLIC BLOOD PRESSURE: 124 MMHG | OXYGEN SATURATION: 98 % | HEIGHT: 69 IN | TEMPERATURE: 97 F | RESPIRATION RATE: 17 BRPM

## 2022-03-15 DIAGNOSIS — E78.5 HYPERLIPIDEMIA, ACQUIRED: ICD-10-CM

## 2022-03-15 DIAGNOSIS — Z12.5 SCREENING FOR PROSTATE CANCER: ICD-10-CM

## 2022-03-15 DIAGNOSIS — Z00.00 MEDICARE ANNUAL WELLNESS VISIT, SUBSEQUENT: Primary | ICD-10-CM

## 2022-03-15 DIAGNOSIS — Z12.11 COLON CANCER SCREENING: ICD-10-CM

## 2022-03-15 DIAGNOSIS — I10 ESSENTIAL HYPERTENSION: ICD-10-CM

## 2022-03-15 PROCEDURE — 1170F FXNL STATUS ASSESSED: CPT | Performed by: FAMILY MEDICINE

## 2022-03-15 PROCEDURE — 1126F AMNT PAIN NOTED NONE PRSNT: CPT | Performed by: FAMILY MEDICINE

## 2022-03-15 PROCEDURE — 1159F MED LIST DOCD IN RCRD: CPT | Performed by: FAMILY MEDICINE

## 2022-03-15 PROCEDURE — G0439 PPPS, SUBSEQ VISIT: HCPCS | Performed by: FAMILY MEDICINE

## 2022-03-15 NOTE — PROGRESS NOTES
The ABCs of the Annual Wellness Visit  Subsequent Medicare Wellness Visit    Chief Complaint   Patient presents with   • Medicare Wellness-subsequent      Subjective    History of Present Illness:  Endy Bagley is a 67 y.o. male who presents for a Subsequent Medicare Wellness Visit.    The following portions of the patient's history were reviewed and   updated as appropriate: allergies, current medications, past family history, past medical history, past social history, past surgical history and problem list.    Compared to one year ago, the patient feels his physical   health is the same.    Compared to one year ago, the patient feels his mental   health is the same.    Recent Hospitalizations:  He was not admitted to the hospital during the last year.       Current Medical Providers:  Patient Care Team:  Gina Bojorquez MD as PCP - General (Family Medicine)  Stephen Roach MD as Consulting Physician (Hematology and Oncology)  Teto Russell Jr., MD as Referring Physician (Family Medicine)    Outpatient Medications Prior to Visit   Medication Sig Dispense Refill   • aspirin 81 MG tablet Take 81 mg by mouth daily.     • atenolol (TENORMIN) 25 MG tablet TAKE ONE TABLET BY MOUTH DAILY 90 tablet 1   • simvastatin (ZOCOR) 40 MG tablet TAKE ONE TABLET BY MOUTH ONCE NIGHTLY 90 tablet 2     No facility-administered medications prior to visit.       No opioid medication identified on active medication list. I have reviewed chart for other potential  high risk medication/s and harmful drug interactions in the elderly.          Aspirin is on active medication list. Aspirin use is indicated based on review of current medical condition/s. Pros and cons of this therapy have been discussed today. Benefits of this medication outweigh potential harm.  Patient has been encouraged to continue taking this medication.  .      Patient Active Problem List   Diagnosis   • Chronic lymphocytic leukemia of B-cell type (HCC)   •  "Stenosis of left subclavian artery (HCC)   • Hyperlipidemia, acquired   • Gastroesophageal reflux disease with esophagitis   • Essential hypertension   • Tendinitis of left knee     Advance Care Planning  Advance Directive is not on file.  ACP discussion was held with the patient during this visit. Patient does not have an advance directive, declines further assistance.          Objective    Vitals:    03/15/22 0804   BP: 124/74   BP Location: Right arm   Patient Position: Sitting   Cuff Size: Adult   Pulse: 66   Resp: 17   Temp: 97 °F (36.1 °C)   TempSrc: Temporal   SpO2: 98%   Weight: 93.8 kg (206 lb 11.2 oz)   Height: 175.3 cm (69.02\")   PainSc: 0-No pain     BMI Readings from Last 1 Encounters:   03/15/22 30.51 kg/m²   BMI is above normal parameters. Recommendations include: exercise counseling    Does the patient have evidence of cognitive impairment? No    Physical Exam  Lab Results   Component Value Date    CHLPL 151 2022    TRIG 43 2022    HDL 71 2022    LDL 70 2022    VLDL 10 2022            HEALTH RISK ASSESSMENT    Smoking Status:  Social History     Tobacco Use   Smoking Status Former Smoker   • Packs/day: 0.50   • Years: 5.00   • Pack years: 2.50   • Types: Cigars   • Quit date: 1/3/2016   • Years since quittin.2   Smokeless Tobacco Never Used     Alcohol Consumption:  Social History     Substance and Sexual Activity   Alcohol Use Yes    Comment: 3-4 cans of beer daily     Fall Risk Screen:    [facility] Fall Risk Assessment was completed, and patient is at LOW risk for falls.Assessment completed on:3/15/2022    Depression Screening:  PHQ-2/PHQ-9 Depression Screening 3/15/2022   Retired Total Score -   Little Interest or Pleasure in Doing Things 0-->not at all   Feeling Down, Depressed or Hopeless 0-->not at all   PHQ-9: Brief Depression Severity Measure Score 0       Health Habits and Functional and Cognitive Screening:  Functional & Cognitive Status 3/15/2022   Do you " have difficulty preparing food and eating? No   Do you have difficulty bathing yourself, getting dressed or grooming yourself? No   Do you have difficulty using the toilet? No   Do you have difficulty moving around from place to place? No   Do you have trouble with steps or getting out of a bed or a chair? No   Current Diet Well Balanced Diet   Dental Exam Up to date   Eye Exam Up to date   Exercise (times per week) 1 times per week   Current Exercises Include Yard Work   Do you need help using the phone?  No   Are you deaf or do you have serious difficulty hearing?  No   Do you need help with transportation? No   Do you need help shopping? No   Do you need help preparing meals?  No   Do you need help with housework?  No   Do you need help with laundry? No   Do you need help taking your medications? No   Do you need help managing money? No   Do you ever drive or ride in a car without wearing a seat belt? No   Have you felt unusual stress, anger or loneliness in the last month? No   Who do you live with? Spouse   If you need help, do you have trouble finding someone available to you? No   Have you been bothered in the last four weeks by sexual problems? No   Do you have difficulty concentrating, remembering or making decisions? No       Age-appropriate Screening Schedule:  Refer to the list below for future screening recommendations based on patient's age, sex and/or medical conditions. Orders for these recommended tests are listed in the plan section. The patient has been provided with a written plan.    Health Maintenance   Topic Date Due   • ZOSTER VACCINE (1 of 2) Never done   • LIPID PANEL  03/11/2023   • TDAP/TD VACCINES (2 - Td or Tdap) 08/14/2027   • INFLUENZA VACCINE  Completed              Assessment/Plan   CMS Preventative Services Quick Reference  Risk Factors Identified During Encounter  None Identified  The above risks/problems have been discussed with the patient.  Follow up actions/plans if indicated  are seen below in the Assessment/Plan Section.  Pertinent information has been shared with the patient in the After Visit Summary.    Diagnoses and all orders for this visit:    1. Medicare annual wellness visit, subsequent (Primary)    2. Essential hypertension    3. Hyperlipidemia, acquired    4. Colon cancer screening  -     Cologuard - Stool, Per Rectum; Future    5. Screening for prostate cancer  -     PSA Screen        Follow Up:   No follow-ups on file.     An After Visit Summary and PPPS were made available to the patient.               Nail concerns  Ridges, this is on all the fingernails and he says the toenails though I did not look at these on exam.  Over the years  All year long, all seasons.  Keep keeps nails short to avoid splitting.  Cracking nail is painful.  Most common nail that cracks is the fourth digit on the right hand.  There are deeper ridges at this location.  There is no crack or open skin at this time.  Ridges and if longer will crack.  We talked about considering lab work for this including iron labs and zinc.  He says it does not bother him he was just wondering about it and not interested in labs at this time.  I encouraged him to moisturize with something like Vaseline to keep them from being dry and worse.  If things become worse despite what he is doing or he is having more splitting we can either initiate lab evaluation or would recommend referral to dermatology.  He voiced understanding.    We talked about PSA and screening for prostate cancer he would like to do the blood exam but not the physical exam EVE.

## 2022-03-16 LAB
Lab: NORMAL
PSA SERPL-MCNC: 1.1 NG/ML (ref 0–4)
WRITTEN AUTHORIZATION: NORMAL

## 2022-03-29 RX ORDER — SIMVASTATIN 40 MG
TABLET ORAL
Qty: 90 TABLET | Refills: 3 | Status: SHIPPED | OUTPATIENT
Start: 2022-03-29 | End: 2023-03-21 | Stop reason: SDUPTHER

## 2022-07-11 ENCOUNTER — OFFICE VISIT (OUTPATIENT)
Dept: FAMILY MEDICINE CLINIC | Facility: CLINIC | Age: 67
End: 2022-07-11

## 2022-07-11 VITALS
OXYGEN SATURATION: 96 % | TEMPERATURE: 96.4 F | HEIGHT: 69 IN | HEART RATE: 55 BPM | DIASTOLIC BLOOD PRESSURE: 78 MMHG | BODY MASS INDEX: 29.92 KG/M2 | WEIGHT: 202 LBS | SYSTOLIC BLOOD PRESSURE: 156 MMHG

## 2022-07-11 DIAGNOSIS — J01.00 ACUTE NON-RECURRENT MAXILLARY SINUSITIS: Primary | ICD-10-CM

## 2022-07-11 PROCEDURE — 99213 OFFICE O/P EST LOW 20 MIN: CPT | Performed by: NURSE PRACTITIONER

## 2022-07-11 RX ORDER — AMOXICILLIN 875 MG/1
875 TABLET, COATED ORAL 2 TIMES DAILY
Qty: 20 TABLET | Refills: 0 | Status: SHIPPED | OUTPATIENT
Start: 2022-07-11 | End: 2022-07-21

## 2022-07-11 NOTE — PROGRESS NOTES
"Chief Complaint  Sinus Problem (C/o sinus pressure, headaches, earaches in both ears, nasal drainage, x over a week )    Subjective        Endy Bagley presents to [facility]  History of Present Illness new patient to me.  He is here for sinusitis type symptoms with bilateral maxillary pressure, itchy watery eyes at times and congestion and pain of both ears.  They both feel full and he is having some mild dizziness associated with this.  He denies fever, chills, myalgia.  No known COVID exposures and is boosted.  Has not tested for COVID.  He feels like he is getting worse.  Typically does not take any allergy medicine or decongestants as he does not like the way they make him feel.    Gets this periodically every couple years and always needs an antibiotic to resolve. In the past when he has not been able to get an abx he has to go back and get an abx and requesting abx today.           Objective   Vital Signs:  /78   Pulse 55   Temp 96.4 °F (35.8 °C)   Ht 175.3 cm (69\")   Wt 91.6 kg (202 lb)   SpO2 96%   BMI 29.83 kg/m²   Estimated body mass index is 29.83 kg/m² as calculated from the following:    Height as of this encounter: 175.3 cm (69\").    Weight as of this encounter: 91.6 kg (202 lb).          Physical Exam  Vitals and nursing note reviewed.   Constitutional:       General: He is not in acute distress.     Appearance: He is well-developed. He is not ill-appearing or diaphoretic.   HENT:      Head: Normocephalic and atraumatic.      Right Ear: Ear canal and external ear normal.      Left Ear: Ear canal normal.      Ears:      Comments: B/l TM dull, no erythema     Mouth/Throat:      Mouth: Mucous membranes are moist.      Pharynx: Posterior oropharyngeal erythema (+PND) present. No oropharyngeal exudate.   Eyes:      General:         Right eye: No discharge.         Left eye: No discharge.      Conjunctiva/sclera: Conjunctivae normal.   Cardiovascular:      Rate " and Rhythm: Normal rate and regular rhythm.      Heart sounds: Normal heart sounds.   Pulmonary:      Effort: Pulmonary effort is normal.      Breath sounds: Normal breath sounds.   Abdominal:      General: Bowel sounds are normal.      Palpations: Abdomen is soft.      Tenderness: There is no abdominal tenderness.   Musculoskeletal:         General: No deformity.      Cervical back: Neck supple.      Comments: Gait smooth and steady   Lymphadenopathy:      Cervical: No cervical adenopathy.   Skin:     General: Skin is warm and dry.   Neurological:      General: No focal deficit present.      Mental Status: He is alert and oriented to person, place, and time.   Psychiatric:         Mood and Affect: Mood normal.         Behavior: Behavior normal.        Result Review :                Assessment and Plan   Diagnoses and all orders for this visit:    1. Acute non-recurrent maxillary sinusitis (Primary)  -     amoxicillin (AMOXIL) 875 MG tablet; Take 1 tablet by mouth 2 (Two) Times a Day for 10 days.  Dispense: 20 tablet; Refill: 0    discussed with pt that abx not indicated at this time and he would likely benefit more from sudafed for few days with flonase, but pt wants abx today.  Will give Rx but recommend starting decongestants first for few days and will likely not need abx.  Goal to avoid abx overuse and resistance.  If no better or worsen, follow up with PCP.          Follow Up   Return if symptoms worsen or fail to improve.  Patient was given instructions and counseling regarding his condition or for health maintenance advice. Please see specific information pulled into the AVS if appropriate.

## 2022-08-29 RX ORDER — ATENOLOL 25 MG/1
TABLET ORAL
Qty: 90 TABLET | Refills: 2 | Status: SHIPPED | OUTPATIENT
Start: 2022-08-29

## 2023-03-13 DIAGNOSIS — I10 ESSENTIAL HYPERTENSION: ICD-10-CM

## 2023-03-13 DIAGNOSIS — Z12.5 SCREENING FOR PROSTATE CANCER: ICD-10-CM

## 2023-03-13 DIAGNOSIS — E78.5 HYPERLIPIDEMIA, ACQUIRED: Primary | ICD-10-CM

## 2023-03-21 ENCOUNTER — OFFICE VISIT (OUTPATIENT)
Dept: FAMILY MEDICINE CLINIC | Facility: CLINIC | Age: 68
End: 2023-03-21
Payer: MEDICARE

## 2023-03-21 VITALS
HEIGHT: 69 IN | OXYGEN SATURATION: 98 % | BODY MASS INDEX: 31.1 KG/M2 | HEART RATE: 73 BPM | DIASTOLIC BLOOD PRESSURE: 72 MMHG | RESPIRATION RATE: 16 BRPM | SYSTOLIC BLOOD PRESSURE: 126 MMHG | TEMPERATURE: 97.7 F | WEIGHT: 210 LBS

## 2023-03-21 DIAGNOSIS — I10 ESSENTIAL HYPERTENSION: ICD-10-CM

## 2023-03-21 DIAGNOSIS — M72.2 PLANTAR FASCIITIS, LEFT: ICD-10-CM

## 2023-03-21 DIAGNOSIS — Z00.00 MEDICARE ANNUAL WELLNESS VISIT, SUBSEQUENT: Primary | ICD-10-CM

## 2023-03-21 DIAGNOSIS — E78.5 HYPERLIPIDEMIA, ACQUIRED: ICD-10-CM

## 2023-03-21 DIAGNOSIS — M70.71 BURSITIS OF OTHER BURSA OF RIGHT HIP: ICD-10-CM

## 2023-03-21 PROCEDURE — G0439 PPPS, SUBSEQ VISIT: HCPCS | Performed by: FAMILY MEDICINE

## 2023-03-21 PROCEDURE — 3078F DIAST BP <80 MM HG: CPT | Performed by: FAMILY MEDICINE

## 2023-03-21 PROCEDURE — 3074F SYST BP LT 130 MM HG: CPT | Performed by: FAMILY MEDICINE

## 2023-03-21 RX ORDER — SIMVASTATIN 20 MG
20 TABLET ORAL NIGHTLY
Qty: 90 TABLET | Refills: 3 | Status: SHIPPED | OUTPATIENT
Start: 2023-03-21 | End: 2023-07-13 | Stop reason: SDUPTHER

## 2023-03-21 NOTE — PROGRESS NOTES
The ABCs of the Annual Wellness Visit  Subsequent Medicare Wellness Visit    Subjective      Endy Bagley is a 68 y.o. male who presents for a Subsequent Medicare Wellness Visit.    The following portions of the patient's history were reviewed and   updated as appropriate: allergies, current medications, past family history, past medical history, past social history, past surgical history and problem list.    Compared to one year ago, the patient feels his physical   health is the same.    Compared to one year ago, the patient feels his mental   health is the same.    Recent Hospitalizations:  He was not admitted to the hospital during the last year.       Current Medical Providers:  Patient Care Team:  Gina Bojorquez MD as PCP - General (Family Medicine)  Stephen Roach MD as Consulting Physician (Hematology and Oncology)  Teto Russell Jr., MD as Referring Physician (Family Medicine)    Outpatient Medications Prior to Visit   Medication Sig Dispense Refill   • aspirin 81 MG tablet Take 1 tablet by mouth Daily.     • atenolol (TENORMIN) 25 MG tablet TAKE ONE TABLET BY MOUTH DAILY 90 tablet 2   • simvastatin (ZOCOR) 40 MG tablet TAKE ONE TABLET BY MOUTH ONCE NIGHTLY 90 tablet 3     No facility-administered medications prior to visit.       No opioid medication identified on active medication list. I have reviewed chart for other potential  high risk medication/s and harmful drug interactions in the elderly.          Aspirin is on active medication list. Aspirin use is indicated based on review of current medical condition/s. Pros and cons of this therapy have been discussed today. Benefits of this medication outweigh potential harm.  Patient has been encouraged to continue taking this medication.  .      Patient Active Problem List   Diagnosis   • Chronic lymphocytic leukemia of B-cell type   • Stenosis of left subclavian artery   • Hyperlipidemia, acquired   • Gastroesophageal reflux disease with  "esophagitis   • Essential hypertension   • Tendinitis of left knee     Advance Care Planning  Advance Directive is not on file.  ACP discussion was held with the patient during this visit. Patient does not have an advance directive, information provided.     Objective    Vitals:    03/21/23 0834   BP: 126/72   BP Location: Right arm   Patient Position: Sitting   Cuff Size: Adult   Pulse: 73   Resp: 16   Temp: 97.7 °F (36.5 °C)   TempSrc: Infrared   SpO2: 98%   Weight: 95.3 kg (210 lb)   Height: 175.3 cm (69\")   PainSc: 0-No pain     Estimated body mass index is 31.01 kg/m² as calculated from the following:    Height as of this encounter: 175.3 cm (69\").    Weight as of this encounter: 95.3 kg (210 lb).    BMI is >= 30 and <35. (Class 1 Obesity). The following options were offered after discussion;: exercise counseling/recommendations and nutrition counseling/recommendations    Physical Exam  Vitals reviewed.   Constitutional:       General: He is not in acute distress.     Appearance: Normal appearance. He is not ill-appearing or toxic-appearing.   HENT:      Head: Normocephalic and atraumatic.      Right Ear: Tympanic membrane, ear canal and external ear normal. There is no impacted cerumen.      Left Ear: Tympanic membrane, ear canal and external ear normal. There is no impacted cerumen.      Nose: Nose normal.      Mouth/Throat:      Mouth: Mucous membranes are moist.      Pharynx: Oropharynx is clear. No oropharyngeal exudate.   Eyes:      General: No scleral icterus.        Right eye: No discharge.         Left eye: No discharge.      Conjunctiva/sclera: Conjunctivae normal.   Neck:      Thyroid: No thyromegaly.      Vascular: No carotid bruit.   Cardiovascular:      Rate and Rhythm: Normal rate and regular rhythm.      Heart sounds: Normal heart sounds. No murmur heard.    No friction rub. No gallop.   Pulmonary:      Effort: Pulmonary effort is normal. No respiratory distress.      Breath sounds: Normal " breath sounds. No wheezing or rales.   Chest:      Chest wall: No tenderness.   Abdominal:      General: Bowel sounds are normal. There is no distension.      Palpations: Abdomen is soft. There is no mass.      Tenderness: There is no abdominal tenderness. There is no guarding.   Musculoskeletal:         General: No swelling or deformity.      Cervical back: Neck supple.      Right lower leg: No edema.      Left lower leg: No edema.   Lymphadenopathy:      Cervical: No cervical adenopathy.   Skin:     Coloration: Skin is not jaundiced or pale.   Neurological:      Mental Status: He is alert and oriented to person, place, and time.      Motor: No abnormal muscle tone.      Coordination: Coordination normal.   Psychiatric:         Mood and Affect: Mood normal.         Behavior: Behavior normal.           Does the patient have evidence of cognitive impairment?   No    Lab Results   Component Value Date    CHLPL 141 03/16/2023    TRIG 44 03/16/2023    HDL 73 03/16/2023    LDL 58 03/16/2023    VLDL 10 03/16/2023          HEALTH RISK ASSESSMENT    Smoking Status:  Social History     Tobacco Use   Smoking Status Former   • Types: Cigars   Smokeless Tobacco Never     Alcohol Consumption:  Social History     Substance and Sexual Activity   Alcohol Use Yes   • Alcohol/week: 4.0 standard drinks   • Types: 4 Cans of beer per week    Comment: 3-4 cans of beer daily     Fall Risk Screen:    [facility] Fall Risk Assessment was completed, and patient is at LOW risk for falls.Assessment completed on:3/21/2023    Depression Screening:      3/21/2023     8:36 AM   PHQ-2/PHQ-9 Depression Screening   Little Interest or Pleasure in Doing Things 0-->not at all   Feeling Down, Depressed or Hopeless 0-->not at all   PHQ-9: Brief Depression Severity Measure Score 0       Health Habits and Functional and Cognitive Screening:      3/21/2023     8:37 AM   Functional & Cognitive Status   Do you have difficulty preparing food and eating? No   Do you  have difficulty bathing yourself, getting dressed or grooming yourself? No   Do you have difficulty using the toilet? No   Do you have difficulty moving around from place to place? No   Do you have trouble with steps or getting out of a bed or a chair? No   Current Diet Well Balanced Diet   Dental Exam Up to date   Eye Exam Up to date   Exercise (times per week) 0 times per week   Current Exercises Include No Regular Exercise   Do you need help using the phone?  No   Are you deaf or do you have serious difficulty hearing?  No   Do you need help with transportation? No   Do you need help shopping? No   Do you need help preparing meals?  No   Do you need help with housework?  No   Do you need help with laundry? No   Do you need help taking your medications? No   Do you need help managing money? No   Do you ever drive or ride in a car without wearing a seat belt? No   Have you felt unusual stress, anger or loneliness in the last month? No   Who do you live with? Spouse   If you need help, do you have trouble finding someone available to you? No   Have you been bothered in the last four weeks by sexual problems? No   Do you have difficulty concentrating, remembering or making decisions? No       Age-appropriate Screening Schedule:  Refer to the list below for future screening recommendations based on patient's age, sex and/or medical conditions. Orders for these recommended tests are listed in the plan section. The patient has been provided with a written plan.    Health Maintenance   Topic Date Due   • ZOSTER VACCINE (1 of 2) Never done   • COLORECTAL CANCER SCREENING  02/28/2023   • INFLUENZA VACCINE  08/01/2023   • LIPID PANEL  03/16/2024   • ANNUAL WELLNESS VISIT  03/21/2024   • TDAP/TD VACCINES (2 - Td or Tdap) 08/14/2027   • HEPATITIS C SCREENING  Completed   • COVID-19 Vaccine  Completed   • Pneumococcal Vaccine 65+  Completed   • AAA SCREEN (ONE-TIME)  Completed                CMS Preventative Services Quick  Reference  Risk Factors Identified During Encounter:    None Identified    The above risks/problems have been discussed with the patient.  Pertinent information has been shared with the patient in the After Visit Summary.    Diagnoses and all orders for this visit:    1. Medicare annual wellness visit, subsequent (Primary)    2. Hyperlipidemia, acquired  -     Lipid Panel; Future    3. Essential hypertension    4. Bursitis of other bursa of right hip  -     Ambulatory Referral to Sports Medicine    5. Plantar fasciitis, left    Other orders  -     simvastatin (ZOCOR) 20 MG tablet; Take 1 tablet by mouth Every Night.  Dispense: 90 tablet; Refill: 3        Follow Up:   Next Medicare Wellness visit to be scheduled in 1 year.      An After Visit Summary and PPPS were made available to the patient.        Right hip  Hurts, lateral aspect.   Hurts to the touch.   Wakes hip up at night.   Move the wrong way or still and then move after long time. Hurts.   Going on for about 6 months. Pretty much the same. No injury.   Has not tried any interventions.   Given his location of pain, symptoms, and palpable tenderness on exam I would consider most likely that this is a bursitis and going to send to sports for further evaluation and possible treatment.     Left foot pain  Says days he gets up and first 2-3 steps really painful.   Going on for about the same amount of time.   Some days not as bad. Other days are worse.   If he stretches, this alleviates.   Add in ice and inserts for the shoes.   Stretch routinely. Going on a little long but not utilizing all therapies. Incorporate these.   May help if he also has less pain in the hip.

## 2023-03-24 ENCOUNTER — OFFICE VISIT (OUTPATIENT)
Dept: SPORTS MEDICINE | Facility: CLINIC | Age: 68
End: 2023-03-24
Payer: MEDICARE

## 2023-03-24 VITALS
BODY MASS INDEX: 30.57 KG/M2 | HEART RATE: 67 BPM | RESPIRATION RATE: 16 BRPM | OXYGEN SATURATION: 97 % | HEIGHT: 69 IN | TEMPERATURE: 98 F | WEIGHT: 206.4 LBS

## 2023-03-24 DIAGNOSIS — M70.61 TROCHANTERIC BURSITIS, RIGHT HIP: Primary | ICD-10-CM

## 2023-03-24 DIAGNOSIS — M25.551 RIGHT HIP PAIN: ICD-10-CM

## 2023-03-24 RX ORDER — TRIAMCINOLONE ACETONIDE 40 MG/ML
40 INJECTION, SUSPENSION INTRA-ARTICULAR; INTRAMUSCULAR
Status: DISCONTINUED | OUTPATIENT
Start: 2023-03-24 | End: 2023-03-24 | Stop reason: HOSPADM

## 2023-03-24 RX ADMIN — TRIAMCINOLONE ACETONIDE 40 MG: 40 INJECTION, SUSPENSION INTRA-ARTICULAR; INTRAMUSCULAR at 14:05

## 2023-03-24 NOTE — PROGRESS NOTES
"Endy is a 68 y.o. year old male presents to [facility]    Chief Complaint   Patient presents with   • Right Hip - Initial Evaluation   • Hip Pain       History of Present Illness  Seen at the request of Dr. Bojorquez. Onset 2 mo ago. NKI. Right lateral hip pain. Disrupts sleep when lying on this side. No treatments tried. Does not bother him walking, no limp. No groin, testicular pain. Requests injection.    I have reviewed the patient's medical, family, and social history in detail and updated the computerized patient record.    Pulse 67   Temp 98 °F (36.7 °C)   Resp 16   Ht 175.3 cm (69\")   Wt 93.6 kg (206 lb 6.4 oz)   SpO2 97%   BMI 30.48 kg/m²      Physical Exam    Mask worn thru encounter  Vital signs reviewed.   General: No acute distress.  Eyes: conjunctiva clear; pupils equally round and reactive  ENT: external ears atraumatic  CV: no peripheral edema  Resp: normal respiratory effort, no use of accessory muscles  Skin: no rashes or wounds; normal turgor  Psych: mood and affect appropriate; recent and remote memory intact  Neuro: sensation to light touch intact    MSK Exam  R hip: - log roll. Full ROM. - FABERE, - FADIR. - [place]. TTP along greater trochanter.    Right Hip X-Ray  Indication: Pain  AP and Frog Leg views    Findings:  No fracture  No bony lesion  Normal soft tissues  Normal joint spaces    No prior studies were available for comparison.        Large Joint Arthrocentesis: R greater trochanteric bursa  Date/Time: 3/24/2023 2:05 PM  Consent given by: patient  Site marked: site marked  Timeout: Immediately prior to procedure a time out was called to verify the correct patient, procedure, equipment, support staff and site/side marked as required   Supporting Documentation  Indications: pain   Procedure Details  Location: hip - R greater trochanteric bursa  Needle size: 25 G  Approach: lateral  Medications administered: 40 mg triamcinolone acetonide 40 " MG/ML  Patient tolerance: patient tolerated the procedure well with no immediate complications          Diagnoses and all orders for this visit:    Trochanteric bursitis, right hip  -     Large Joint Arthrocentesis    Right hip pain  -     XR Hip With or Without Pelvis 2 - 3 View Right      Discussed exam, XR findings. sxs c/w GT bursitis. Discussed options and offered CSI today. HEP given to complete several times weekly for 4 wks.      Follow Up   Return if symptoms worsen or fail to improve, for trochanteric bursitis handout.  Patient was given instructions and counseling regarding his condition or for health maintenance advice. Please see specific information pulled into the AVS if appropriate.     EMR Dragon/Transcription disclaimer:    Much of this encounter note is an electronic transcription/translation of spoken language to printed text.  The electronic translation of spoken language may permit erroneous, or at times, nonsensical words or phrases to be inadvertently transcribed.  Although I have reviewed the note for such errors some may still exist.

## 2023-05-30 NOTE — TELEPHONE ENCOUNTER
"    Caller: Endy Bagley \"Aubrey\"    Relationship: Self    Best call back number: [phone]    Requested Prescriptions:   Requested Prescriptions     Pending Prescriptions Disp Refills   • atenolol (TENORMIN) 25 MG tablet 90 tablet 2     Sig: Take 1 tablet by mouth Daily.        Pharmacy where request should be sent: [facility] [id number] [facility] [id number] [place] [address] AT [facility] [phone] [facility] [phone] FX     Last office visit with prescribing clinician: Visit date not found   Last telemedicine visit with prescribing clinician: Visit date not found   Next office visit with prescribing clinician: Visit date not found     Additional details provided by patient:    Does the patient have less than a 3 day supply:  [x] Yes  [] No        Alexander Plasencia Rep   05/30/23 09:59 EDT           "

## 2023-05-31 RX ORDER — ATENOLOL 25 MG/1
25 TABLET ORAL DAILY
Qty: 90 TABLET | Refills: 0 | Status: SHIPPED | OUTPATIENT
Start: 2023-05-31

## 2023-05-31 NOTE — TELEPHONE ENCOUNTER
Rx Refill Note  Requested Prescriptions     Pending Prescriptions Disp Refills   • atenolol (TENORMIN) 25 MG tablet 90 tablet 2     Sig: Take 1 tablet by mouth Daily.      Last office visit with prescribing clinician: 03/21/23  Last telemedicine visit with prescribing clinician: Visit date not found   Next office visit with prescribing clinician: 07/13/23      {TIP  Please add Last Relevant Lab Date if appropriate: 03/16/23                 Would you like a call back once the refill request has been completed: [] Yes [] No    If the office needs to give you a call back, can they leave a voicemail: [] Yes [] No    Lois Valles MA  05/31/23, 13:14 EDT

## 2023-07-21 ENCOUNTER — TELEPHONE (OUTPATIENT)
Dept: ONCOLOGY | Facility: CLINIC | Age: 68
End: 2023-07-21
Payer: MEDICARE

## 2023-07-21 NOTE — TELEPHONE ENCOUNTER
"  Caller: Endy Bagley \"Sheryl\"    Relationship: Self    Best call back number: [phone]    What is the best time to reach you: ANY    Who are you requesting to speak with (clinical staff, provider,  specific staff member): DR CIFUENTES/CLINICAL/SCHEDULING        What was the call regarding: PATIENT SAW DR CIFUENTES IN MAY 2020. WAS TOLD NOT TO COME IN IF HE WASN'T SICK (DURING COVID)    WILL DR CIFUENTES SEE SHERYL WITHOUT A REFERRAL?    Is it okay if the provider responds through MyChart: NO        "

## 2023-07-24 NOTE — TELEPHONE ENCOUNTER
Called patient and informed him we would see him, but it would be the length of a new patient appt. Pt v/u. Message sent to scheduling. Roberta Birch RN

## 2023-08-01 ENCOUNTER — LAB (OUTPATIENT)
Dept: LAB | Facility: HOSPITAL | Age: 68
End: 2023-08-01
Payer: MEDICARE

## 2023-08-01 ENCOUNTER — OFFICE VISIT (OUTPATIENT)
Dept: ONCOLOGY | Facility: CLINIC | Age: 68
End: 2023-08-01
Payer: MEDICARE

## 2023-08-01 VITALS
HEART RATE: 64 BPM | DIASTOLIC BLOOD PRESSURE: 84 MMHG | SYSTOLIC BLOOD PRESSURE: 147 MMHG | TEMPERATURE: 96.6 F | HEIGHT: 69 IN | BODY MASS INDEX: 29.86 KG/M2 | RESPIRATION RATE: 18 BRPM | WEIGHT: 201.6 LBS | OXYGEN SATURATION: 98 %

## 2023-08-01 DIAGNOSIS — C91.11 CHRONIC LYMPHOCYTIC LEUKEMIA OF B-CELL TYPE IN REMISSION: Primary | ICD-10-CM

## 2023-08-01 LAB
ALBUMIN SERPL-MCNC: 3.7 G/DL (ref 3.5–5.2)
ALBUMIN/GLOB SERPL: 1.9 G/DL
ALP SERPL-CCNC: 59 U/L (ref 39–117)
ALT SERPL W P-5'-P-CCNC: 16 U/L (ref 1–41)
ANION GAP SERPL CALCULATED.3IONS-SCNC: 17 MMOL/L (ref 5–15)
AST SERPL-CCNC: 23 U/L (ref 1–40)
BASOPHILS # BLD AUTO: 0.05 10*3/MM3 (ref 0–0.2)
BASOPHILS NFR BLD AUTO: 0.8 % (ref 0–1.5)
BILIRUB SERPL-MCNC: 0.8 MG/DL (ref 0–1.2)
BUN SERPL-MCNC: 9 MG/DL (ref 8–23)
BUN/CREAT SERPL: 9.5 (ref 7–25)
CALCIUM SPEC-SCNC: 8.4 MG/DL (ref 8.6–10.5)
CHLORIDE SERPL-SCNC: 105 MMOL/L (ref 98–107)
CO2 SERPL-SCNC: 18 MMOL/L (ref 22–29)
CREAT SERPL-MCNC: 0.95 MG/DL (ref 0.7–1.3)
DEPRECATED RDW RBC AUTO: 41.1 FL (ref 37–54)
EGFRCR SERPLBLD CKD-EPI 2021: 87.2 ML/MIN/1.73
EOSINOPHIL # BLD AUTO: 0.15 10*3/MM3 (ref 0–0.4)
EOSINOPHIL NFR BLD AUTO: 2.5 % (ref 0.3–6.2)
ERYTHROCYTE [DISTWIDTH] IN BLOOD BY AUTOMATED COUNT: 12.4 % (ref 12.3–15.4)
GLOBULIN UR ELPH-MCNC: 1.9 GM/DL
GLUCOSE SERPL-MCNC: 89 MG/DL (ref 65–99)
HCT VFR BLD AUTO: 45.8 % (ref 37.5–51)
HGB BLD-MCNC: 15.1 G/DL (ref 13–17.7)
IMM GRANULOCYTES # BLD AUTO: 0.02 10*3/MM3 (ref 0–0.05)
IMM GRANULOCYTES NFR BLD AUTO: 0.3 % (ref 0–0.5)
LYMPHOCYTES # BLD AUTO: 1.21 10*3/MM3 (ref 0.7–3.1)
LYMPHOCYTES NFR BLD AUTO: 20.3 % (ref 19.6–45.3)
MCH RBC QN AUTO: 30 PG (ref 26.6–33)
MCHC RBC AUTO-ENTMCNC: 33 G/DL (ref 31.5–35.7)
MCV RBC AUTO: 90.9 FL (ref 79–97)
MONOCYTES # BLD AUTO: 0.64 10*3/MM3 (ref 0.1–0.9)
MONOCYTES NFR BLD AUTO: 10.7 % (ref 5–12)
NEUTROPHILS NFR BLD AUTO: 3.9 10*3/MM3 (ref 1.7–7)
NEUTROPHILS NFR BLD AUTO: 65.4 % (ref 42.7–76)
NRBC BLD AUTO-RTO: 0 /100 WBC (ref 0–0.2)
PLATELET # BLD AUTO: 272 10*3/MM3 (ref 140–450)
PMV BLD AUTO: 8.5 FL (ref 6–12)
POTASSIUM SERPL-SCNC: 4.1 MMOL/L (ref 3.5–5.2)
PROT SERPL-MCNC: 5.6 G/DL (ref 6–8.5)
RBC # BLD AUTO: 5.04 10*6/MM3 (ref 4.14–5.8)
SODIUM SERPL-SCNC: 140 MMOL/L (ref 136–145)
WBC NRBC COR # BLD: 5.97 10*3/MM3 (ref 3.4–10.8)

## 2023-08-01 PROCEDURE — 80053 COMPREHEN METABOLIC PANEL: CPT | Performed by: INTERNAL MEDICINE

## 2023-08-01 PROCEDURE — 85025 COMPLETE CBC W/AUTO DIFF WBC: CPT | Performed by: INTERNAL MEDICINE

## 2023-08-01 PROCEDURE — 36415 COLL VENOUS BLD VENIPUNCTURE: CPT | Performed by: INTERNAL MEDICINE

## 2023-11-20 ENCOUNTER — OFFICE VISIT (OUTPATIENT)
Dept: FAMILY MEDICINE CLINIC | Facility: CLINIC | Age: 68
End: 2023-11-20
Payer: MEDICARE

## 2023-11-20 VITALS
HEART RATE: 64 BPM | OXYGEN SATURATION: 98 % | DIASTOLIC BLOOD PRESSURE: 80 MMHG | SYSTOLIC BLOOD PRESSURE: 142 MMHG | HEIGHT: 69 IN | WEIGHT: 201 LBS | TEMPERATURE: 96.6 F | RESPIRATION RATE: 17 BRPM | BODY MASS INDEX: 29.77 KG/M2

## 2023-11-20 DIAGNOSIS — I77.1 SUBCLAVIAN ARTERY STENOSIS, LEFT: Primary | ICD-10-CM

## 2023-11-20 DIAGNOSIS — E78.5 HYPERLIPIDEMIA, ACQUIRED: ICD-10-CM

## 2023-11-20 DIAGNOSIS — J01.00 ACUTE NON-RECURRENT MAXILLARY SINUSITIS: ICD-10-CM

## 2023-11-20 PROCEDURE — 3077F SYST BP >= 140 MM HG: CPT | Performed by: FAMILY MEDICINE

## 2023-11-20 PROCEDURE — 3079F DIAST BP 80-89 MM HG: CPT | Performed by: FAMILY MEDICINE

## 2023-11-20 PROCEDURE — 99213 OFFICE O/P EST LOW 20 MIN: CPT | Performed by: FAMILY MEDICINE

## 2023-11-20 RX ORDER — AMOXICILLIN AND CLAVULANATE POTASSIUM 500; 125 MG/1; MG/1
1 TABLET, FILM COATED ORAL 2 TIMES DAILY
Qty: 20 TABLET | Refills: 0 | Status: SHIPPED | OUTPATIENT
Start: 2023-11-20 | End: 2023-11-30

## 2023-11-20 NOTE — PROGRESS NOTES
"Chief Complaint  Chief Complaint   Patient presents with    Sinus Problem     Pt c/o sinus pressure, drainage, sore throat x 2 weeks        Subjective    History of Present Illness        Endy Bagley presents to [facility] for   History of Present Illness  Patient is a 60-year-old male is being seen in clinic for multiple medical problems.  Patient has complaints of left arm numbness and increased sensitivity to temperature.  Patient reports having had a problem with this arm in the past due to a artery stenosis.  He reports that he would like to be seen by a vascular surgeon for further evaluation of this condition.  Sinus Problem  This is a new problem. The current episode started 1 to 4 weeks ago. There has been no fever. The pain is mild. Associated symptoms include chills, congestion, coughing, headaches, sinus pressure and a sore throat. Past treatments include nothing. The treatment provided no relief.        Objective   Vital Signs:   Visit Vitals  /80   Pulse 64   Temp 96.6 °F (35.9 °C)   Resp 17   Ht 175.3 cm (69.02\")   Wt 91.2 kg (201 lb)   SpO2 98%   BMI 29.67 kg/m²             Physical Exam  Vitals reviewed.   Constitutional:       Appearance: He is well-developed.   HENT:      Head: Normocephalic.      Right Ear: External ear normal.      Left Ear: External ear normal.      Nose:      Right Sinus: Maxillary sinus tenderness present.      Left Sinus: Maxillary sinus tenderness present.   Eyes:      Conjunctiva/sclera: Conjunctivae normal.   Cardiovascular:      Rate and Rhythm: Normal rate and regular rhythm.   Pulmonary:      Effort: Pulmonary effort is normal.      Breath sounds: Normal breath sounds.   Musculoskeletal:         General: Normal range of motion.      Cervical back: Normal range of motion and neck supple.   Skin:     General: Skin is warm and dry.      Capillary Refill: Capillary refill takes less than 2 seconds.   Neurological:      Mental " Status: He is alert and oriented to person, place, and time.            Result Review :                    Assessment and Plan      Diagnoses and all orders for this visit:    1. Subclavian artery stenosis, left (Primary)  Assessment & Plan:  Patient will be referred to vascular surgery for further evaluation and treatment    Orders:  -     Ambulatory Referral to Vascular Surgery    2. Acute non-recurrent maxillary sinusitis  Assessment & Plan:  he was prescribed Augmentin to treat his symptoms.    Increase fluids. Tylenol/motrin for pain or fever.   Medication and medication adverse effects discussed.    Follow-up 5-7 days for reevaluation if not improved or sooner if needed.    Orders:  -     amoxicillin-clavulanate (Augmentin) 500-125 MG per tablet; Take 1 tablet by mouth 2 (Two) Times a Day for 10 days.  Dispense: 20 tablet; Refill: 0    3. Hyperlipidemia, acquired  -     CBC & Differential  -     Comprehensive Metabolic Panel  -     TSH  -     Lipid Panel With / Chol / HDL Ratio             Follow Up   No follow-ups on file.  Patient was given instructions and counseling regarding his condition or for health maintenance advice. Please see specific information pulled into the AVS if appropriate.

## 2023-12-04 ENCOUNTER — OFFICE VISIT (OUTPATIENT)
Dept: FAMILY MEDICINE CLINIC | Facility: CLINIC | Age: 68
End: 2023-12-04
Payer: MEDICARE

## 2023-12-04 VITALS
OXYGEN SATURATION: 100 % | DIASTOLIC BLOOD PRESSURE: 72 MMHG | TEMPERATURE: 96.6 F | HEART RATE: 66 BPM | HEIGHT: 69 IN | RESPIRATION RATE: 17 BRPM | WEIGHT: 201 LBS | SYSTOLIC BLOOD PRESSURE: 130 MMHG | BODY MASS INDEX: 29.77 KG/M2

## 2023-12-04 DIAGNOSIS — R05.1 ACUTE COUGH: Primary | ICD-10-CM

## 2023-12-04 RX ORDER — DEXAMETHASONE SODIUM PHOSPHATE 10 MG/ML
10 INJECTION INTRAMUSCULAR; INTRAVENOUS ONCE
Status: COMPLETED | OUTPATIENT
Start: 2023-12-04 | End: 2023-12-04

## 2023-12-04 RX ORDER — METHYLPREDNISOLONE ACETATE 80 MG/ML
80 INJECTION, SUSPENSION INTRA-ARTICULAR; INTRALESIONAL; INTRAMUSCULAR; SOFT TISSUE ONCE
Status: COMPLETED | OUTPATIENT
Start: 2023-12-04 | End: 2023-12-04

## 2023-12-04 RX ORDER — CIPROFLOXACIN 500 MG/1
500 TABLET, FILM COATED ORAL 2 TIMES DAILY
Qty: 20 TABLET | Refills: 0 | Status: SHIPPED | OUTPATIENT
Start: 2023-12-04 | End: 2023-12-20

## 2023-12-04 RX ORDER — CEFTRIAXONE SODIUM 250 MG/1
1000 INJECTION, POWDER, FOR SOLUTION INTRAMUSCULAR; INTRAVENOUS ONCE
Status: COMPLETED | OUTPATIENT
Start: 2023-12-04 | End: 2023-12-04

## 2023-12-04 RX ORDER — CODEINE PHOSPHATE/GUAIFENESIN 10-100MG/5
5 LIQUID (ML) ORAL 3 TIMES DAILY PRN
Qty: 150 ML | Refills: 0 | Status: SHIPPED | OUTPATIENT
Start: 2023-12-04 | End: 2023-12-20

## 2023-12-04 RX ADMIN — CEFTRIAXONE SODIUM 1000 MG: 250 INJECTION, POWDER, FOR SOLUTION INTRAMUSCULAR; INTRAVENOUS at 13:11

## 2023-12-04 RX ADMIN — DEXAMETHASONE SODIUM PHOSPHATE 10 MG: 10 INJECTION INTRAMUSCULAR; INTRAVENOUS at 13:11

## 2023-12-04 RX ADMIN — METHYLPREDNISOLONE ACETATE 80 MG: 80 INJECTION, SUSPENSION INTRA-ARTICULAR; INTRALESIONAL; INTRAMUSCULAR; SOFT TISSUE at 13:12

## 2023-12-04 NOTE — PROGRESS NOTES
"Chief Complaint  Chief Complaint   Patient presents with    Cough     Pt c/o cough, nasal congestion, ear pressure 3 weeks        Subjective    History of Present Illness        Endy Bagley presents to [facility] for   Cough  This is a new problem. The current episode started 1 to 4 weeks ago. The problem has been unchanged. The problem occurs constantly. The cough is Productive of sputum. Associated symptoms include nasal congestion. Pertinent negatives include no chest pain, myalgias, rash, sore throat, shortness of breath or weight loss. The treatment provided no relief. There is no history of bronchiectasis, bronchitis or COPD.        Objective   Vital Signs:   Visit Vitals  /72   Pulse 66   Temp 96.6 °F (35.9 °C)   Resp 17   Ht 175.3 cm (69.02\")   Wt 91.2 kg (201 lb)   SpO2 100%   BMI 29.67 kg/m²             Physical Exam  Vitals reviewed.   Constitutional:       Appearance: He is well-developed.   HENT:      Head: Normocephalic.      Right Ear: External ear normal.      Left Ear: External ear normal.      Nose: Nose normal.   Eyes:      Conjunctiva/sclera: Conjunctivae normal.   Cardiovascular:      Rate and Rhythm: Normal rate and regular rhythm.   Pulmonary:      Effort: Pulmonary effort is normal.      Breath sounds: Normal breath sounds.   Musculoskeletal:         General: Normal range of motion.      Cervical back: Normal range of motion and neck supple.   Skin:     General: Skin is warm and dry.      Capillary Refill: Capillary refill takes less than 2 seconds.   Neurological:      Mental Status: He is alert and oriented to person, place, and time.            Result Review :                    Assessment and Plan      Diagnoses and all orders for this visit:    1. Acute cough (Primary)  Assessment & Plan:  he was prescribed Cipro and Cheratussin to treat his symptoms.  Patient was given an Rocephin 1 g, Depo-Medrol 80 mg and dexamethasone 10 mg IM " injection.  Increase fluids. Tylenol/motrin for pain or fever.   Medication and medication adverse effects discussed.    Follow-up 5-7 days for reevaluation if not improved or sooner if needed.    Orders:  -     ciprofloxacin (CIPRO) 500 MG tablet; Take 1 tablet by mouth 2 (Two) Times a Day.  Dispense: 20 tablet; Refill: 0  -     cefTRIAXone (ROCEPHIN) injection 1,000 mg  -     methylPREDNISolone acetate (DEPO-medrol) injection 80 mg  -     dexAMETHasone (DECADRON) injection 10 mg  -     guaiFENesin-codeine (GUAIFENESIN AC) 100-10 MG/5ML liquid; Take 5 mL by mouth 3 (Three) Times a Day As Needed for Cough.  Dispense: 150 mL; Refill: 0             Follow Up   No follow-ups on file.  Patient was given instructions and counseling regarding his condition or for health maintenance advice. Please see specific information pulled into the AVS if appropriate.

## 2023-12-18 PROBLEM — R05.1 ACUTE COUGH: Status: ACTIVE | Noted: 2023-12-18

## 2023-12-18 NOTE — ASSESSMENT & PLAN NOTE
he was prescribed Cipro and Cheratussin to treat his symptoms.  Patient was given an Rocephin 1 g, Depo-Medrol 80 mg and dexamethasone 10 mg IM injection.  Increase fluids. Tylenol/motrin for pain or fever.   Medication and medication adverse effects discussed.    Follow-up 5-7 days for reevaluation if not improved or sooner if needed.

## 2023-12-20 ENCOUNTER — OFFICE VISIT (OUTPATIENT)
Dept: FAMILY MEDICINE CLINIC | Facility: CLINIC | Age: 68
End: 2023-12-20
Payer: MEDICARE

## 2023-12-20 VITALS
HEART RATE: 65 BPM | BODY MASS INDEX: 29.77 KG/M2 | HEIGHT: 69 IN | TEMPERATURE: 97.7 F | RESPIRATION RATE: 17 BRPM | OXYGEN SATURATION: 100 % | WEIGHT: 201 LBS | SYSTOLIC BLOOD PRESSURE: 132 MMHG | DIASTOLIC BLOOD PRESSURE: 80 MMHG

## 2023-12-20 DIAGNOSIS — R05.1 ACUTE COUGH: Primary | ICD-10-CM

## 2023-12-20 PROCEDURE — 99213 OFFICE O/P EST LOW 20 MIN: CPT | Performed by: FAMILY MEDICINE

## 2023-12-20 PROCEDURE — 3075F SYST BP GE 130 - 139MM HG: CPT | Performed by: FAMILY MEDICINE

## 2023-12-20 PROCEDURE — 3079F DIAST BP 80-89 MM HG: CPT | Performed by: FAMILY MEDICINE

## 2023-12-20 RX ORDER — MONTELUKAST SODIUM 10 MG/1
10 TABLET ORAL NIGHTLY
Qty: 90 TABLET | Refills: 2 | Status: SHIPPED | OUTPATIENT
Start: 2023-12-20

## 2023-12-20 NOTE — PROGRESS NOTES
"Chief Complaint  Chief Complaint   Patient presents with    Cough     Pt c/o cough not getting better        Subjective    History of Present Illness        Endy Bagley presents to [facility] for   Cough  This is a recurrent problem. The current episode started more than 1 month ago. Associated symptoms include headaches, postnasal drip and rhinorrhea. Pertinent negatives include no chest pain, chills, ear pain, heartburn, hemoptysis, myalgias, rash, sore throat, sweats or wheezing. The treatment provided mild relief.        Objective   Vital Signs:   Visit Vitals  /80   Pulse 65   Temp 97.7 °F (36.5 °C)   Resp 17   Ht 175.3 cm (69.02\")   Wt 91.2 kg (201 lb)   SpO2 100%   BMI 29.67 kg/m²             Physical Exam  Vitals reviewed.   Constitutional:       Appearance: He is well-developed.   HENT:      Head: Normocephalic.      Right Ear: External ear normal.      Left Ear: External ear normal.      Nose: Nose normal.   Eyes:      Conjunctiva/sclera: Conjunctivae normal.   Cardiovascular:      Rate and Rhythm: Normal rate and regular rhythm.   Pulmonary:      Effort: Pulmonary effort is normal.      Breath sounds: Normal breath sounds.   Musculoskeletal:         General: Normal range of motion.      Cervical back: Normal range of motion and neck supple.   Skin:     General: Skin is warm and dry.      Capillary Refill: Capillary refill takes less than 2 seconds.   Neurological:      Mental Status: He is alert and oriented to person, place, and time.            Result Review :                    Assessment and Plan      Diagnoses and all orders for this visit:    1. Acute cough (Primary)  Assessment & Plan:  Due to his recurrent symptoms of cough this could likely be from allergies.  Patient was started on Singulair to help treat his symptoms.  Patient was encouraged to return to clinic if symptoms do not improve.    Orders:  -     montelukast (Singulair) 10 MG tablet; Take 1 " tablet by mouth Every Night.  Dispense: 90 tablet; Refill: 2             Follow Up   No follow-ups on file.  Patient was given instructions and counseling regarding his condition or for health maintenance advice. Please see specific information pulled into the AVS if appropriate.

## 2023-12-28 NOTE — ASSESSMENT & PLAN NOTE
Due to his recurrent symptoms of cough this could likely be from allergies.  Patient was started on Singulair to help treat his symptoms.  Patient was encouraged to return to clinic if symptoms do not improve.

## 2024-03-19 LAB
ALBUMIN SERPL-MCNC: 4 G/DL (ref 3.5–5.2)
ALBUMIN/GLOB SERPL: 1.8 G/DL
ALP SERPL-CCNC: 64 U/L (ref 39–117)
ALT SERPL-CCNC: 39 U/L (ref 1–41)
AST SERPL-CCNC: 32 U/L (ref 1–40)
BASOPHILS # BLD AUTO: 0.04 10*3/MM3 (ref 0–0.2)
BASOPHILS NFR BLD AUTO: 0.5 % (ref 0–1.5)
BILIRUB SERPL-MCNC: 0.8 MG/DL (ref 0–1.2)
BUN SERPL-MCNC: 8 MG/DL (ref 8–23)
BUN/CREAT SERPL: 8.9 (ref 7–25)
CALCIUM SERPL-MCNC: 9.3 MG/DL (ref 8.6–10.5)
CHLORIDE SERPL-SCNC: 104 MMOL/L (ref 98–107)
CHOLEST SERPL-MCNC: 160 MG/DL (ref 0–200)
CHOLEST/HDLC SERPL: 1.76 {RATIO}
CO2 SERPL-SCNC: 23.6 MMOL/L (ref 22–29)
CREAT SERPL-MCNC: 0.9 MG/DL (ref 0.76–1.27)
EGFRCR SERPLBLD CKD-EPI 2021: 92.5 ML/MIN/1.73
EOSINOPHIL # BLD AUTO: 0.09 10*3/MM3 (ref 0–0.4)
EOSINOPHIL NFR BLD AUTO: 1.1 % (ref 0.3–6.2)
ERYTHROCYTE [DISTWIDTH] IN BLOOD BY AUTOMATED COUNT: 13.1 % (ref 12.3–15.4)
GLOBULIN SER CALC-MCNC: 2.2 GM/DL
GLUCOSE SERPL-MCNC: 92 MG/DL (ref 65–99)
HCT VFR BLD AUTO: 45.7 % (ref 37.5–51)
HDLC SERPL-MCNC: 91 MG/DL (ref 40–60)
HGB BLD-MCNC: 15.4 G/DL (ref 13–17.7)
IMM GRANULOCYTES # BLD AUTO: 0.04 10*3/MM3 (ref 0–0.05)
IMM GRANULOCYTES NFR BLD AUTO: 0.5 % (ref 0–0.5)
LDLC SERPL CALC-MCNC: 59 MG/DL (ref 0–100)
LYMPHOCYTES # BLD AUTO: 1.29 10*3/MM3 (ref 0.7–3.1)
LYMPHOCYTES NFR BLD AUTO: 15.1 % (ref 19.6–45.3)
MCH RBC QN AUTO: 31.2 PG (ref 26.6–33)
MCHC RBC AUTO-ENTMCNC: 33.7 G/DL (ref 31.5–35.7)
MCV RBC AUTO: 92.5 FL (ref 79–97)
MONOCYTES # BLD AUTO: 0.67 10*3/MM3 (ref 0.1–0.9)
MONOCYTES NFR BLD AUTO: 7.8 % (ref 5–12)
NEUTROPHILS # BLD AUTO: 6.41 10*3/MM3 (ref 1.7–7)
NEUTROPHILS NFR BLD AUTO: 75 % (ref 42.7–76)
NRBC BLD AUTO-RTO: 0 /100 WBC (ref 0–0.2)
PLATELET # BLD AUTO: 293 10*3/MM3 (ref 140–450)
POTASSIUM SERPL-SCNC: 5 MMOL/L (ref 3.5–5.2)
PROT SERPL-MCNC: 6.2 G/DL (ref 6–8.5)
RBC # BLD AUTO: 4.94 10*6/MM3 (ref 4.14–5.8)
SODIUM SERPL-SCNC: 140 MMOL/L (ref 136–145)
TRIGL SERPL-MCNC: 45 MG/DL (ref 0–150)
TSH SERPL DL<=0.005 MIU/L-ACNC: 1.55 UIU/ML (ref 0.27–4.2)
VLDLC SERPL CALC-MCNC: 10 MG/DL (ref 5–40)
WBC # BLD AUTO: 8.54 10*3/MM3 (ref 3.4–10.8)

## 2024-03-25 ENCOUNTER — OFFICE VISIT (OUTPATIENT)
Dept: FAMILY MEDICINE CLINIC | Facility: CLINIC | Age: 69
End: 2024-03-25
Payer: MEDICARE

## 2024-03-25 VITALS
TEMPERATURE: 97.5 F | HEART RATE: 80 BPM | BODY MASS INDEX: 28.29 KG/M2 | RESPIRATION RATE: 18 BRPM | SYSTOLIC BLOOD PRESSURE: 138 MMHG | WEIGHT: 191 LBS | OXYGEN SATURATION: 100 % | DIASTOLIC BLOOD PRESSURE: 80 MMHG | HEIGHT: 69 IN

## 2024-03-25 DIAGNOSIS — Z00.00 MEDICARE ANNUAL WELLNESS VISIT, SUBSEQUENT: Primary | ICD-10-CM

## 2024-03-25 NOTE — PROGRESS NOTES
The ABCs of the Annual Wellness Visit  Subsequent Medicare Wellness Visit    Subjective    Endy Bagley is a 69 y.o. male who presents for a Subsequent Medicare Wellness Visit.    The following portions of the patient's history were reviewed and   updated as appropriate: allergies, current medications, past family history, past medical history, past social history, past surgical history, and problem list.    Compared to one year ago, the patient feels his physical   health is the same.    Compared to one year ago, the patient feels his mental   health is the same.    Recent Hospitalizations:  He was not admitted to the hospital during the last year.       Current Medical Providers:  Patient Care Team:  Christopher Mejia Sr., MD as PCP - General (Family Medicine)  Stephen Roach MD as Consulting Physician (Hematology and Oncology)  Teto Russell Jr., MD as Referring Physician (Family Medicine)    Outpatient Medications Prior to Visit   Medication Sig Dispense Refill    aspirin 81 MG tablet Take 1 tablet by mouth Daily.      atenolol (TENORMIN) 25 MG tablet Take 1 tablet by mouth Daily. 90 tablet 3    montelukast (Singulair) 10 MG tablet Take 1 tablet by mouth Every Night. 90 tablet 2    simvastatin (ZOCOR) 20 MG tablet Take 1 tablet by mouth Every Night. 90 tablet 3     No facility-administered medications prior to visit.       No opioid medication identified on active medication list. I have reviewed chart for other potential  high risk medication/s and harmful drug interactions in the elderly.        Aspirin is on active medication list. Aspirin use is indicated based on review of current medical condition/s. Pros and cons of this therapy have been discussed today. Benefits of this medication outweigh potential harm.  Patient has been encouraged to continue taking this medication.  .      Patient Active Problem List   Diagnosis    Chronic lymphocytic leukemia of B-cell type    Subclavian artery stenosis,  "left    Hyperlipidemia, acquired    Gastroesophageal reflux disease with esophagitis    Essential hypertension    Tendinitis of left knee    Acute non-recurrent maxillary sinusitis    Acute cough    Medicare annual wellness visit, subsequent     Advance Care Planning   Advance Care Planning     Advance Directive is not on file.  ACP discussion was held with the patient during this visit. Patient does not have an advance directive, information provided.     Objective    Vitals:    03/25/24 1418   BP: 138/80   Pulse: 80   Resp: 18   Temp: 97.5 °F (36.4 °C)   SpO2: 100%   Weight: 86.6 kg (191 lb)   Height: 175.3 cm (69.02\")   PainSc: 0-No pain     Estimated body mass index is 28.19 kg/m² as calculated from the following:    Height as of this encounter: 175.3 cm (69.02\").    Weight as of this encounter: 86.6 kg (191 lb).    BMI is >= 25 and <30. (Overweight) The following options were offered after discussion;: exercise counseling/recommendations and nutrition counseling/recommendations      Does the patient have evidence of cognitive impairment? No    Lab Results   Component Value Date    CHLPL 160 03/18/2024    TRIG 45 03/18/2024    HDL 91 (H) 03/18/2024    LDL 59 03/18/2024    VLDL 10 03/18/2024        HEALTH RISK ASSESSMENT    Smoking Status:  Social History     Tobacco Use   Smoking Status Former    Types: Cigars   Smokeless Tobacco Never     Alcohol Consumption:  Social History     Substance and Sexual Activity   Alcohol Use Yes    Alcohol/week: 4.0 standard drinks of alcohol    Types: 4 Cans of beer per week    Comment: 3-4 cans of beer daily     Fall Risk Screen:    STEADI Fall Risk Assessment was completed, and patient is at LOW risk for falls.Assessment completed on:3/25/2024    Depression Screening:      3/25/2024     2:19 PM   PHQ-2/PHQ-9 Depression Screening   Little Interest or Pleasure in Doing Things 0-->not at all   Feeling Down, Depressed or Hopeless 0-->not at all   PHQ-9: Brief Depression Severity " Measure Score 0       Health Habits and Functional and Cognitive Screening:      3/25/2024     2:00 PM   Functional & Cognitive Status   Do you have difficulty preparing food and eating? No   Do you have difficulty bathing yourself, getting dressed or grooming yourself? No   Do you have difficulty using the toilet? No   Do you have difficulty moving around from place to place? No   Do you have trouble with steps or getting out of a bed or a chair? No   Current Diet Well Balanced Diet   Dental Exam Not up to date   Eye Exam Not up to date   Exercise (times per week) 2 times per week   Current Exercises Include Walking;Yard Work   Do you need help using the phone?  No   Are you deaf or do you have serious difficulty hearing?  No   Do you need help to go to places out of walking distance? No   Do you need help shopping? No   Do you need help preparing meals?  No   Do you need help with housework?  No   Do you need help with laundry? No   Do you need help taking your medications? No   Do you need help managing money? No   Do you ever drive or ride in a car without wearing a seat belt? No   Have you felt unusual stress, anger or loneliness in the last month? No   Who do you live with? Spouse   If you need help, do you have trouble finding someone available to you? No   Have you been bothered in the last four weeks by sexual problems? No   Do you have difficulty concentrating, remembering or making decisions? No       Age-appropriate Screening Schedule:  Refer to the list below for future screening recommendations based on patient's age, sex and/or medical conditions. Orders for these recommended tests are listed in the plan section. The patient has been provided with a written plan.    Health Maintenance   Topic Date Due    COLORECTAL CANCER SCREENING  02/28/2023    COVID-19 Vaccine (5 - 2023-24 season) 09/01/2023    ZOSTER VACCINE (2 of 2) 04/20/2024    INFLUENZA VACCINE  08/01/2024    LIPID PANEL  03/18/2025    ANNUAL  WELLNESS VISIT  03/25/2025    BMI FOLLOWUP  03/25/2025    TDAP/TD VACCINES (2 - Td or Tdap) 08/14/2027    HEPATITIS C SCREENING  Completed    RSV Vaccine - Adults  Completed    Pneumococcal Vaccine 65+  Completed    AAA SCREEN (ONE-TIME)  Completed              Mini-Mental State Examination (MMSE)        Instructions: Ask the questions in the order listed. Score one point for each correct response within each question or activity.      Maximum Score  Patient’s Score  Questions    5   5 “What is the year?  Season?  Date?  Day of the week?  Month?”    5   5 “Where are we now: State?  County?  Town/city?  Hospital?  Floor?”    3  3  The examiner names three unrelated objects clearly and slowly, then asks the patient to name all three of them. The patient’s response is used for scoring. The examiner repeats them until patient learns all of them, if possible. Number of trials: ___________    5  5  “I would like you to count backward from 100 by sevens.” (93, 86, 79, 72, 65, …) Stop after five answers.   Alternative: “Spell WORLD backwards.” ([id number])    3   3 “Earlier I told you the names of three things. Can you tell me what those were?”    2   2 Show the patient two simple objects, such as a wristwatch and a pencil, and ask the patient to name them.    1   1 “Repeat the phrase: ‘No ifs, ands, or buts.’”    3   3 “Take the paper in your right hand, fold it in half, and put it on the floor.”   (The examiner gives the patient a piece of blank paper.)    1  1  “Please read this and do what it says.” (Written instruction is “Close your eyes.”)    1  1  “Make up and write a sentence about anything.” (This sentence must contain a noun and a verb.)    1   1 “Please copy this picture.” (The examiner gives the patient a blank piece of paper and asks him/her to draw the symbol below. All 10 angles must be present and two must intersect.)             30  30  TOTAL        CMS Preventative Services Quick Reference  Risk Factors  "Identified During Encounter  None Identified  The above risks/problems have been discussed with the patient.  Pertinent information has been shared with the patient in the After Visit Summary.  An After Visit Summary and PPPS were made available to the patient.    Follow Up:   Next Medicare Wellness visit to be scheduled in 1 year.       Additional E&M Note during same encounter follows:  Patient has multiple medical problems which are significant and separately identifiable that require additional work above and beyond the Medicare Wellness Visit.      Chief Complaint  Medicare Wellness-subsequent (AWV)    Subjective        HPI  Endy Bagley is also being seen today for     Review of Systems   All other systems reviewed and are negative.      Objective   Vital Signs:  /80   Pulse 80   Temp 97.5 °F (36.4 °C)   Resp 18   Ht 175.3 cm (69.02\")   Wt 86.6 kg (191 lb)   SpO2 100%   BMI 28.19 kg/m²     Physical Exam  Vitals reviewed.   Constitutional:       Appearance: He is well-developed.   HENT:      Head: Normocephalic and atraumatic.      Nose: Nose normal.   Eyes:      General: Lids are normal.      Conjunctiva/sclera: Conjunctivae normal.      Pupils: Pupils are equal, round, and reactive to light.   Cardiovascular:      Rate and Rhythm: Normal rate and regular rhythm.      Pulses: Normal pulses.      Heart sounds: Normal heart sounds.   Pulmonary:      Effort: Pulmonary effort is normal. No respiratory distress.      Breath sounds: Normal breath sounds.   Abdominal:      General: Bowel sounds are normal.      Palpations: Abdomen is soft.   Musculoskeletal:         General: Normal range of motion.      Cervical back: Normal range of motion and neck supple.   Skin:     General: Skin is warm and dry.      Capillary Refill: Capillary refill takes less than 2 seconds.   Neurological:      Mental Status: He is alert and oriented to person, place, and time.   Psychiatric:         Behavior: Behavior normal. "         Thought Content: Thought content normal.         Judgment: Judgment normal.                         Assessment and Plan   Diagnoses and all orders for this visit:    1. Medicare annual wellness visit, subsequent (Primary)  Assessment & Plan:  Medicare wellness completed.  Discussed advanced directives with patient.  Performed the Mini-Mental exam and patient scored 30/30.               Follow Up   No follow-ups on file.  Patient was given instructions and counseling regarding his condition or for health maintenance advice. Please see specific information pulled into the AVS if appropriate.

## 2024-04-11 PROBLEM — Z00.00 MEDICARE ANNUAL WELLNESS VISIT, SUBSEQUENT: Status: ACTIVE | Noted: 2024-04-11

## 2024-08-14 ENCOUNTER — OFFICE VISIT (OUTPATIENT)
Dept: ONCOLOGY | Facility: CLINIC | Age: 69
End: 2024-08-14
Payer: MEDICARE

## 2024-08-14 ENCOUNTER — LAB (OUTPATIENT)
Dept: LAB | Facility: HOSPITAL | Age: 69
End: 2024-08-14
Payer: MEDICARE

## 2024-08-14 VITALS
HEIGHT: 69 IN | SYSTOLIC BLOOD PRESSURE: 161 MMHG | DIASTOLIC BLOOD PRESSURE: 81 MMHG | OXYGEN SATURATION: 99 % | BODY MASS INDEX: 28.14 KG/M2 | TEMPERATURE: 98.2 F | RESPIRATION RATE: 17 BRPM | WEIGHT: 190 LBS | HEART RATE: 55 BPM

## 2024-08-14 DIAGNOSIS — C91.11 CHRONIC LYMPHOCYTIC LEUKEMIA OF B-CELL TYPE IN REMISSION: Primary | ICD-10-CM

## 2024-08-14 DIAGNOSIS — C91.11 CHRONIC LYMPHOCYTIC LEUKEMIA OF B-CELL TYPE IN REMISSION: ICD-10-CM

## 2024-08-14 LAB
ALBUMIN SERPL-MCNC: 4.1 G/DL (ref 3.5–5.2)
ALBUMIN/GLOB SERPL: 2 G/DL
ALP SERPL-CCNC: 56 U/L (ref 39–117)
ALT SERPL W P-5'-P-CCNC: 19 U/L (ref 1–41)
ANION GAP SERPL CALCULATED.3IONS-SCNC: 11.7 MMOL/L (ref 5–15)
AST SERPL-CCNC: 26 U/L (ref 1–40)
BASOPHILS # BLD AUTO: 0.04 10*3/MM3 (ref 0–0.2)
BASOPHILS NFR BLD AUTO: 0.6 % (ref 0–1.5)
BILIRUB SERPL-MCNC: 0.9 MG/DL (ref 0–1.2)
BUN SERPL-MCNC: 7 MG/DL (ref 8–23)
BUN/CREAT SERPL: 6.8 (ref 7–25)
CALCIUM SPEC-SCNC: 9 MG/DL (ref 8.6–10.5)
CHLORIDE SERPL-SCNC: 105 MMOL/L (ref 98–107)
CO2 SERPL-SCNC: 23.3 MMOL/L (ref 22–29)
CREAT SERPL-MCNC: 1.03 MG/DL (ref 0.76–1.27)
DEPRECATED RDW RBC AUTO: 42 FL (ref 37–54)
EGFRCR SERPLBLD CKD-EPI 2021: 78.6 ML/MIN/1.73
EOSINOPHIL # BLD AUTO: 0.08 10*3/MM3 (ref 0–0.4)
EOSINOPHIL NFR BLD AUTO: 1.2 % (ref 0.3–6.2)
ERYTHROCYTE [DISTWIDTH] IN BLOOD BY AUTOMATED COUNT: 12.2 % (ref 12.3–15.4)
GLOBULIN UR ELPH-MCNC: 2.1 GM/DL
GLUCOSE SERPL-MCNC: 90 MG/DL (ref 65–99)
HCT VFR BLD AUTO: 41.9 % (ref 37.5–51)
HGB BLD-MCNC: 13.8 G/DL (ref 13–17.7)
IMM GRANULOCYTES # BLD AUTO: 0.01 10*3/MM3 (ref 0–0.05)
IMM GRANULOCYTES NFR BLD AUTO: 0.1 % (ref 0–0.5)
LYMPHOCYTES # BLD AUTO: 1.56 10*3/MM3 (ref 0.7–3.1)
LYMPHOCYTES NFR BLD AUTO: 23 % (ref 19.6–45.3)
MCH RBC QN AUTO: 30.7 PG (ref 26.6–33)
MCHC RBC AUTO-ENTMCNC: 32.9 G/DL (ref 31.5–35.7)
MCV RBC AUTO: 93.3 FL (ref 79–97)
MONOCYTES # BLD AUTO: 0.64 10*3/MM3 (ref 0.1–0.9)
MONOCYTES NFR BLD AUTO: 9.4 % (ref 5–12)
NEUTROPHILS NFR BLD AUTO: 4.46 10*3/MM3 (ref 1.7–7)
NEUTROPHILS NFR BLD AUTO: 65.7 % (ref 42.7–76)
NRBC BLD AUTO-RTO: 0 /100 WBC (ref 0–0.2)
PLATELET # BLD AUTO: 227 10*3/MM3 (ref 140–450)
PMV BLD AUTO: 8.5 FL (ref 6–12)
POTASSIUM SERPL-SCNC: 3.9 MMOL/L (ref 3.5–5.2)
PROT SERPL-MCNC: 6.2 G/DL (ref 6–8.5)
RBC # BLD AUTO: 4.49 10*6/MM3 (ref 4.14–5.8)
SODIUM SERPL-SCNC: 140 MMOL/L (ref 136–145)
WBC NRBC COR # BLD AUTO: 6.79 10*3/MM3 (ref 3.4–10.8)

## 2024-08-14 PROCEDURE — 80053 COMPREHEN METABOLIC PANEL: CPT

## 2024-08-14 PROCEDURE — 1159F MED LIST DOCD IN RCRD: CPT | Performed by: NURSE PRACTITIONER

## 2024-08-14 PROCEDURE — 3079F DIAST BP 80-89 MM HG: CPT | Performed by: NURSE PRACTITIONER

## 2024-08-14 PROCEDURE — 99213 OFFICE O/P EST LOW 20 MIN: CPT | Performed by: NURSE PRACTITIONER

## 2024-08-14 PROCEDURE — 1126F AMNT PAIN NOTED NONE PRSNT: CPT | Performed by: NURSE PRACTITIONER

## 2024-08-14 PROCEDURE — 3077F SYST BP >= 140 MM HG: CPT | Performed by: NURSE PRACTITIONER

## 2024-08-14 PROCEDURE — 36415 COLL VENOUS BLD VENIPUNCTURE: CPT

## 2024-08-14 PROCEDURE — 1160F RVW MEDS BY RX/DR IN RCRD: CPT | Performed by: NURSE PRACTITIONER

## 2024-08-14 PROCEDURE — 85025 COMPLETE CBC W/AUTO DIFF WBC: CPT

## 2024-08-14 NOTE — PROGRESS NOTES
"Subjective     CHIEF COMPLAINT:      Chief Complaint   Patient presents with    Follow-up     CLL       HISTORY OF PRESENT ILLNESS:     Endy Bagley is a 69 y.o. male patient who returns today for follow up on his CLL.  He has remained in observation for many years after previous treatment with Bendamustine Rituxan.  He returns today feeling very well.  He has no new pain.  He has no B symptoms.  He is eating and drinking adequately.  He has no new peripheral adenopathy.      ROS:  Pertinent ROS is in the HPI.     Past medical, surgical, social and family history were reviewed.     MEDICATIONS:    Current Outpatient Medications:     aspirin 81 MG tablet, Take 1 tablet by mouth Daily., Disp: , Rfl:     montelukast (Singulair) 10 MG tablet, Take 1 tablet by mouth Every Night., Disp: 90 tablet, Rfl: 2    simvastatin (ZOCOR) 20 MG tablet, Take 1 tablet by mouth Every Night., Disp: 90 tablet, Rfl: 3    atenolol (TENORMIN) 25 MG tablet, Take 1 tablet by mouth Daily., Disp: 90 tablet, Rfl: 3  Objective     VITAL SIGNS:     Vitals:    08/14/24 1424   BP: 161/81   Pulse: 55   Resp: 17   Temp: 98.2 °F (36.8 °C)   TempSrc: Skin   SpO2: 99%   Weight: 86.2 kg (190 lb)   Height: 175.3 cm (69.02\")     Body mass index is 28.05 kg/m².     Wt Readings from Last 5 Encounters:   08/14/24 86.2 kg (190 lb)   03/25/24 86.6 kg (191 lb)   12/20/23 91.2 kg (201 lb)   12/04/23 91.2 kg (201 lb)   11/20/23 91.2 kg (201 lb)     PHYSICAL EXAMINATION:   GENERAL: The patient appears in good general condition, not in acute distress.   SKIN: No ecchymosis.  EYES: No jaundice. No pallor.  LYMPHATICS: No cervical, clavicular or axillary lymphadenopathy.  CHEST: Normal respiratory effort.   CVS: No edema.  ABDOMEN: Soft. No tenderness. No Hepatomegaly. No Splenomegaly. No masses.  EXTREMITIES: No noted deformity.     DIAGNOSTIC DATA:     Results from last 7 days   Lab Units 08/14/24  1406   WBC 10*3/mm3 6.79   NEUTROS ABS 10*3/mm3 4.46   HEMOGLOBIN " g/dL 13.8   HEMATOCRIT % 41.9   PLATELETS 10*3/mm3 227     Results from last 7 days   Lab Units 08/14/24  1406   SODIUM mmol/L 140   POTASSIUM mmol/L 3.9   CHLORIDE mmol/L 105   CO2 mmol/L 23.3   BUN mg/dL 7*   CREATININE mg/dL 1.03   CALCIUM mg/dL 9.0   ALBUMIN g/dL 4.1   BILIRUBIN mg/dL 0.9   ALK PHOS U/L 56   ALT (SGPT) U/L 19   AST (SGOT) U/L 26   GLUCOSE mg/dL 90                  Assessment & Plan      *Chronic lymphoid leukemia  July 2013 peripheral flow cytometry positive for chronic lymphocytic leukemia/small lymphocytic lymphoma  8/17/2013 bone marrow biopsy and aspirate with aspirate noting antigenic profile characteristic of B-cell chronic CLL.  A monoclonal kappa B-cell population coexpressing CD5 and CD23 present.  Dim Expression and dim CD20 expression.  CD38 not detected. Bone marrow flow cytometry noting CLL involved in the marrow accounting for 30 to 50% of marrow cellularity. Normal karotype  10/14/2015 CT scans noting shotty axillary lymph nodes and mediastinal and hilum.  Spleen is normal size.  Mildly enlarged pelvic lymph nodes.  January 2016 the patient initiated treatment with Bendamustine Rituxan due to progressive lymphocytosis  6/17/2016 flow cytometry noting no evidence of increased blast population, no evidence of with propofol operative disorder  Following the completion of Bendamustine Rituxan the patient has remained in observation  Annual follow-up today, 8/14/2024 with no B symptoms, no peripheral adenopathy CBC is normal with WBC 6.79, hemoglobin 13.8, platelets 227,000, absolute lymphocyte count normal at 1.56    *Peripheral artery disease of the left upper extremity  Claudication in the left arm which is unchanged  Following with vascular surgery, Dr. Hdz      PLAN:  The patient will remain in observation regarding his previous malignancy with no evidence of progressive disease and no new B symptoms  We will follow-up transitioning care to Dr. Duffy with CBC, CMP, LDH  Patient  understands to call if he develops any new issues, unexplained weight loss, adenopathy    Kate Galan, APRN  08/14/24

## 2024-08-15 DIAGNOSIS — I10 ESSENTIAL HYPERTENSION: ICD-10-CM

## 2024-08-15 NOTE — TELEPHONE ENCOUNTER
"    Caller: Endy Bagley \"Aubrey\"    Relationship: Self    Best call back number: [phone]    Requested Prescriptions:   Requested Prescriptions     Pending Prescriptions Disp Refills    atenolol (TENORMIN) 25 MG tablet 90 tablet 3     Sig: Take 1 tablet by mouth Daily.        Pharmacy where request should be sent: [facility] [id number] [address] AT [facility] [phone] [facility] [phone] FX     Last office visit with prescribing clinician: 3/25/2024   Last telemedicine visit with prescribing clinician: Visit date not found   Next office visit with prescribing clinician: Visit date not found     Additional details provided by patient:     Does the patient have less than a 3 day supply:  [] Yes  [x] No        Alexander Plasencia Rep   08/15/24 12:43 EDT         "

## 2024-08-19 RX ORDER — ATENOLOL 25 MG/1
25 TABLET ORAL DAILY
Qty: 90 TABLET | Refills: 3 | Status: SHIPPED | OUTPATIENT
Start: 2024-08-19

## 2024-08-19 NOTE — TELEPHONE ENCOUNTER
"    Hub staff attempted to follow warm transfer process and was unsuccessful     Caller: Endy Bgaley \"Aubrey\"    Relationship to patient: Self    Best call back number: [phone]    Patient is needing:     FOLLOWING UP ON THIS REQUEST.     PLEASE CALL TO CONFIRM OR DISCUSS.         "

## 2024-08-21 ENCOUNTER — TELEMEDICINE (OUTPATIENT)
Dept: FAMILY MEDICINE CLINIC | Facility: CLINIC | Age: 69
End: 2024-08-21
Payer: MEDICARE

## 2024-08-21 DIAGNOSIS — R09.81 CONGESTION OF NASAL SINUS: Primary | ICD-10-CM

## 2024-08-21 PROCEDURE — 1126F AMNT PAIN NOTED NONE PRSNT: CPT | Performed by: FAMILY MEDICINE

## 2024-08-21 PROCEDURE — 99213 OFFICE O/P EST LOW 20 MIN: CPT | Performed by: FAMILY MEDICINE

## 2024-08-21 RX ORDER — AMOXICILLIN AND CLAVULANATE POTASSIUM 500; 125 MG/1; MG/1
1 TABLET, FILM COATED ORAL 2 TIMES DAILY
Qty: 20 TABLET | Refills: 0 | Status: SHIPPED | OUTPATIENT
Start: 2024-08-21 | End: 2024-08-31

## 2024-08-21 RX ORDER — BENZONATATE 100 MG/1
100 CAPSULE ORAL 3 TIMES DAILY PRN
Qty: 30 CAPSULE | Refills: 0 | Status: SHIPPED | OUTPATIENT
Start: 2024-08-21

## 2024-08-21 RX ORDER — CODEINE PHOSPHATE/GUAIFENESIN 10-100MG/5
5 LIQUID (ML) ORAL 3 TIMES DAILY PRN
Qty: 180 ML | Refills: 0 | Status: SHIPPED | OUTPATIENT
Start: 2024-08-21

## 2024-08-21 NOTE — ASSESSMENT & PLAN NOTE
Based on his symptoms and his boss having COVID he may likely have COVID as well.  Patient was given prescription for Cheratussin, Tessalon Perles and Augmentin.  Patient was advised to not use Augmentin unless his symptoms lasted more than 10 days.  Patient was encouraged to return to clinic if symptoms do not improve.

## 2024-08-21 NOTE — PROGRESS NOTES
Chief Complaint  No chief complaint on file.        Video/Telephone Visit    Subjective    History of Present Illness        Endy Bagley presents to [facility] for   History of Present Illness  Patient is a 69-year-old male is being seen in the clinic for COVID-like symptoms.  Patient states that this started 5 days ago and he stated he felt like he had some type of bug.  By the third day he started to feel little better but on day 5 his symptoms worsen.  Then today he said he found out his boss has Covid.  His symptoms include coughing, fever, chills,  nasal congestion and sore throat     History of Present Illness        Objective   Vital Signs:   There were no vitals taken for this visit.    Physical Exam  Constitutional:       Appearance: Normal appearance.   Neurological:      General: No focal deficit present.      Mental Status: He is alert and oriented to person, place, and time. Mental status is at baseline.   Psychiatric:         Mood and Affect: Mood normal.         Behavior: Behavior normal.         Thought Content: Thought content normal.         Judgment: Judgment normal.        Physical Exam        Result Review :           Results             Assessment and Plan      Diagnoses and all orders for this visit:    1. Congestion of nasal sinus (Primary)  Assessment & Plan:  Based on his symptoms and his boss having COVID he may likely have COVID as well.  Patient was given prescription for Cheratussin, Tessalon Perles and Augmentin.  Patient was advised to not use Augmentin unless his symptoms lasted more than 10 days.  Patient was encouraged to return to clinic if symptoms do not improve.    Orders:  -     guaiFENesin-codeine (GUAIFENESIN AC) 100-10 MG/5ML liquid; Take 5 mL by mouth 3 (Three) Times a Day As Needed for Cough.  Dispense: 180 mL; Refill: 0  -     benzonatate (TESSALON) 100 MG capsule; Take 1 capsule by mouth 3 (Three) Times a Day As Needed for Cough.   Dispense: 30 capsule; Refill: 0  -     amoxicillin-clavulanate (Augmentin) 500-125 MG per tablet; Take 1 tablet by mouth 2 (Two) Times a Day for 10 days.  Dispense: 20 tablet; Refill: 0       Assessment & Plan          The use of a video visit has been reviewed with the patient and verbal informed consent has been obtained.  Location of patient: home  Location of provider: [facility]  The patient has signed the video visit consent form.  The visit included audio and video interaction. No technical issues occurred during this visit.     Follow Up   No follow-ups on file.  Patient was given instructions and counseling regarding his condition or for health maintenance advice. Please see specific information pulled into the AVS if appropriate.

## 2024-09-12 DIAGNOSIS — E78.5 HYPERLIPIDEMIA, ACQUIRED: ICD-10-CM

## 2024-09-12 NOTE — TELEPHONE ENCOUNTER
Rx Refill Note  Requested Prescriptions     Pending Prescriptions Disp Refills    simvastatin (ZOCOR) 20 MG tablet 90 tablet 3     Sig: Take 1 tablet by mouth Every Night.      Last office visit with prescribing clinician: 3/25/2024   Last telemedicine visit with prescribing clinician: 8/21/2024   Next office visit with prescribing clinician: Visit date not found                         Would you like a call back once the refill request has been completed: [] Yes [] No    If the office needs to give you a call back, can they leave a voicemail: [] Yes [] No    Tamara Gillette MA  09/12/24, 10:17 EDT

## 2024-09-13 RX ORDER — SIMVASTATIN 20 MG
20 TABLET ORAL NIGHTLY
Qty: 90 TABLET | Refills: 3 | Status: SHIPPED | OUTPATIENT
Start: 2024-09-13

## 2024-09-17 DIAGNOSIS — R05.1 ACUTE COUGH: ICD-10-CM

## 2024-09-19 RX ORDER — MONTELUKAST SODIUM 10 MG/1
10 TABLET ORAL NIGHTLY
Qty: 90 TABLET | Refills: 3 | Status: SHIPPED | OUTPATIENT
Start: 2024-09-19

## 2024-10-25 ENCOUNTER — OFFICE VISIT (OUTPATIENT)
Dept: FAMILY MEDICINE CLINIC | Facility: CLINIC | Age: 69
End: 2024-10-25
Payer: MEDICARE

## 2024-10-25 VITALS
BODY MASS INDEX: 27.85 KG/M2 | DIASTOLIC BLOOD PRESSURE: 81 MMHG | OXYGEN SATURATION: 99 % | HEIGHT: 69 IN | WEIGHT: 188 LBS | HEART RATE: 77 BPM | TEMPERATURE: 97.3 F | SYSTOLIC BLOOD PRESSURE: 142 MMHG

## 2024-10-25 DIAGNOSIS — R05.8 POST-VIRAL COUGH SYNDROME: Primary | ICD-10-CM

## 2024-10-25 PROCEDURE — 99213 OFFICE O/P EST LOW 20 MIN: CPT | Performed by: NURSE PRACTITIONER

## 2024-10-25 PROCEDURE — 1126F AMNT PAIN NOTED NONE PRSNT: CPT | Performed by: NURSE PRACTITIONER

## 2024-10-25 PROCEDURE — 3077F SYST BP >= 140 MM HG: CPT | Performed by: NURSE PRACTITIONER

## 2024-10-25 PROCEDURE — 3079F DIAST BP 80-89 MM HG: CPT | Performed by: NURSE PRACTITIONER

## 2024-10-25 RX ORDER — AZITHROMYCIN 250 MG/1
TABLET, FILM COATED ORAL
Qty: 6 TABLET | Refills: 0 | Status: SHIPPED | OUTPATIENT
Start: 2024-10-25 | End: 2024-11-08

## 2024-10-25 RX ORDER — IPRATROPIUM BROMIDE 42 UG/1
1 SPRAY, METERED NASAL 3 TIMES DAILY
Qty: 15 ML | Refills: 0 | Status: SHIPPED | OUTPATIENT
Start: 2024-10-25 | End: 2024-11-08

## 2024-10-25 RX ORDER — DEXTROMETHORPHAN HYDROBROMIDE AND PROMETHAZINE HYDROCHLORIDE 15; 6.25 MG/5ML; MG/5ML
5 SYRUP ORAL 4 TIMES DAILY PRN
Qty: 240 ML | Refills: 1 | Status: SHIPPED | OUTPATIENT
Start: 2024-10-25 | End: 2024-11-08

## 2024-10-25 NOTE — PROGRESS NOTES
"Chief Complaint  URI (Cough,drainage, and headache over a week )    Subjective        Endy Bagley presents to [facility]  History of Present Illness  Pleasant gentleman here today follow-up recent cough congestion sinus pressure, sore throat, started, over a week ago probably 8 or 9 days ago,  He has progressed but he still has a persistent cough is aggravated at nighttime and throughout the day and lots of sinus pressure and congestion sometimes a headache, he has some yellow and thick phlegm, as well, but he has no chest pain no shortness of breath  He was able to walk in here from the parking lot without difficulties, he has had no recent fever several COVID test at home which have been negative    Past medical history no lung disease does not smoke, CLL stable  URI       Objective   Vital Signs:  /81   Pulse 77   Temp 97.3 °F (36.3 °C) (Temporal)   Ht 175.3 cm (69.02\")   Wt 85.3 kg (188 lb)   SpO2 99%   BMI 27.75 kg/m²   Estimated body mass index is 27.75 kg/m² as calculated from the following:    Height as of this encounter: 175.3 cm (69.02\").    Weight as of this encounter: 85.3 kg (188 lb).            Physical Exam  Vitals reviewed.   Constitutional:       Appearance: Normal appearance. He is well-developed. He is not ill-appearing or diaphoretic.   HENT:      Head: Normocephalic.      Nose: Nose normal.   Eyes:      General: No scleral icterus.     Conjunctiva/sclera: Conjunctivae normal.      Pupils: Pupils are equal, round, and reactive to light.   Neck:      Thyroid: No thyromegaly.      Vascular: No JVD.   Cardiovascular:      Rate and Rhythm: Normal rate and regular rhythm.      Heart sounds: Normal heart sounds. No murmur heard.     No friction rub. No gallop.   Pulmonary:      Effort: Pulmonary effort is normal. No respiratory distress.      Breath sounds: Normal breath sounds. No stridor. No wheezing or rales.   Abdominal:      General: Bowel sounds are " normal. There is no distension.      Palpations: Abdomen is soft.      Tenderness: There is no abdominal tenderness.      Comments: No hepatosplenomegaly, no ascites,   Musculoskeletal:         General: No tenderness.      Cervical back: Neck supple.   Lymphadenopathy:      Cervical: No cervical adenopathy.   Skin:     General: Skin is warm and dry.      Findings: No erythema or rash.   Neurological:      General: No focal deficit present.      Mental Status: He is alert and oriented to person, place, and time. Mental status is at baseline.      Deep Tendon Reflexes: Reflexes are normal and symmetric.   Psychiatric:         Behavior: Behavior normal.         Thought Content: Thought content normal.         Judgment: Judgment normal.      Result Review :                Assessment and Plan   Diagnoses and all orders for this visit:    1. Post-viral cough syndrome (Primary)    Other orders  -     azithromycin (Zithromax Z-Kendall) 250 MG tablet; Take 2 tablets the first day, then 1 tablet daily for 4 days.  Dispense: 6 tablet; Refill: 0             Follow Up   No follow-ups on file.  Patient was given instructions and counseling regarding his condition or for health maintenance advice. Please see specific information pulled into the AVS if appropriate.     There are no Patient Instructions on file for this visit.

## 2024-10-25 NOTE — PATIENT INSTRUCTIONS
Discharge instructions, you have postviral syndrome, lots of immune system activity, with no evidence of active and virus  This is a miserable syndrome, saline nasal spray as needed I would try some Afrin  2 sprays each nostril now then once or twice a day no more than 3 days  Saline nasal spray as needed menthol if needed,    Mucinex if thick phlegm    Update me your condition next week please, if you develop  Fever the next several days without shortness of breath go ahead and start the Zithromax, but should you develop high fever shortness of breath chest pain severe symptoms emergency room immediately,

## 2024-11-08 ENCOUNTER — OFFICE VISIT (OUTPATIENT)
Dept: FAMILY MEDICINE CLINIC | Facility: CLINIC | Age: 69
End: 2024-11-08
Payer: MEDICARE

## 2024-11-08 VITALS
HEIGHT: 69 IN | OXYGEN SATURATION: 98 % | WEIGHT: 188 LBS | DIASTOLIC BLOOD PRESSURE: 80 MMHG | SYSTOLIC BLOOD PRESSURE: 130 MMHG | BODY MASS INDEX: 27.85 KG/M2 | RESPIRATION RATE: 18 BRPM | HEART RATE: 70 BPM

## 2024-11-08 DIAGNOSIS — R05.1 ACUTE COUGH: Primary | ICD-10-CM

## 2024-11-08 RX ORDER — BENZONATATE 200 MG/1
200 CAPSULE ORAL 3 TIMES DAILY PRN
Qty: 30 CAPSULE | Refills: 0 | Status: SHIPPED | OUTPATIENT
Start: 2024-11-08

## 2024-11-08 RX ORDER — METHYLPREDNISOLONE ACETATE 80 MG/ML
80 INJECTION, SUSPENSION INTRA-ARTICULAR; INTRALESIONAL; INTRAMUSCULAR; SOFT TISSUE ONCE
Status: COMPLETED | OUTPATIENT
Start: 2024-11-08 | End: 2024-11-08

## 2024-11-08 RX ORDER — CODEINE PHOSPHATE/GUAIFENESIN 10-100MG/5
5 LIQUID (ML) ORAL 3 TIMES DAILY PRN
Qty: 180 ML | Refills: 0 | Status: SHIPPED | OUTPATIENT
Start: 2024-11-08

## 2024-11-08 RX ORDER — CEFTRIAXONE SODIUM 250 MG/1
1000 INJECTION, POWDER, FOR SOLUTION INTRAMUSCULAR; INTRAVENOUS ONCE
Status: COMPLETED | OUTPATIENT
Start: 2024-11-08 | End: 2024-11-08

## 2024-11-08 RX ORDER — CIPROFLOXACIN 500 MG/1
500 TABLET, FILM COATED ORAL 2 TIMES DAILY
Qty: 20 TABLET | Refills: 0 | Status: SHIPPED | OUTPATIENT
Start: 2024-11-08

## 2024-11-08 RX ORDER — PREDNISONE 20 MG/1
40 TABLET ORAL DAILY
Qty: 10 TABLET | Refills: 0 | Status: SHIPPED | OUTPATIENT
Start: 2024-11-08 | End: 2024-11-13

## 2024-11-08 RX ORDER — DEXAMETHASONE SODIUM PHOSPHATE 10 MG/ML
10 INJECTION INTRAMUSCULAR; INTRAVENOUS ONCE
Status: COMPLETED | OUTPATIENT
Start: 2024-11-08 | End: 2024-11-08

## 2024-11-08 RX ADMIN — DEXAMETHASONE SODIUM PHOSPHATE 10 MG: 10 INJECTION INTRAMUSCULAR; INTRAVENOUS at 10:53

## 2024-11-08 RX ADMIN — CEFTRIAXONE SODIUM 1000 MG: 250 INJECTION, POWDER, FOR SOLUTION INTRAMUSCULAR; INTRAVENOUS at 10:53

## 2024-11-08 RX ADMIN — METHYLPREDNISOLONE ACETATE 80 MG: 80 INJECTION, SUSPENSION INTRA-ARTICULAR; INTRALESIONAL; INTRAMUSCULAR; SOFT TISSUE at 10:54

## 2024-11-08 NOTE — PROGRESS NOTES
"Chief Complaint  Chief Complaint   Patient presents with    Cough     3 week f/u        Subjective    History of Present Illness        Edny Bagley presents to [facility] for   History of Present Illness  The patient is a 69-year-old male who presents for evaluation of multiple medical concerns.    He has been experiencing a persistent cough, scratchy throat, headache, and drainage for the past 3 weeks. Despite being prescribed a mist and amoxicillin 250 mg for 5 days by Dr. Epley, his symptoms have not improved. He did not receive any cough medicine.    He reports a recurring issue under his nail bed that causes significant pain and occasionally drains. This problem has been ongoing for over a year.  Cough       History of Present Illness      Objective   Vital Signs:   Visit Vitals  /80   Pulse 70   Resp 18   Ht 175.3 cm (69.02\")   Wt 85.3 kg (188 lb)   SpO2 98%   BMI 27.75 kg/m²                Physical Exam  Vitals reviewed.   Constitutional:       Appearance: He is well-developed.   HENT:      Head: Normocephalic.      Right Ear: External ear normal.      Left Ear: External ear normal.      Nose: Nose normal.   Eyes:      Conjunctiva/sclera: Conjunctivae normal.   Cardiovascular:      Rate and Rhythm: Normal rate and regular rhythm.   Pulmonary:      Effort: Pulmonary effort is normal.      Breath sounds: Normal breath sounds.   Musculoskeletal:         General: Normal range of motion.      Cervical back: Normal range of motion and neck supple.   Skin:     General: Skin is warm and dry.      Capillary Refill: Capillary refill takes less than 2 seconds.   Neurological:      Mental Status: He is alert and oriented to person, place, and time.        Physical Exam           Result Review :  Results                            Assessment and Plan      Diagnoses and all orders for this visit:    1. Acute cough (Primary)  Assessment & Plan:  Given the persistence of symptoms for " 3 weeks, a bacterial infection is suspected. A regimen of Cipro has been prescribed. Additionally, prednisone has been recommended to alleviate inflammation. For nighttime coughing or sleep disturbances, Cheratussin has been prescribed. Tessalon Perles can be taken during the day. An antibiotic injection, Rocephin, and a Depo/dexamethasone injection will be administered today.    Orders:  -     ciprofloxacin (CIPRO) 500 MG tablet; Take 1 tablet by mouth 2 (Two) Times a Day.  Dispense: 20 tablet; Refill: 0  -     predniSONE (DELTASONE) 20 MG tablet; Take 2 tablets by mouth Daily for 5 days.  Dispense: 10 tablet; Refill: 0  -     guaiFENesin-codeine (GUAIFENESIN AC) 100-10 MG/5ML liquid; Take 5 mL by mouth 3 (Three) Times a Day As Needed for Cough.  Dispense: 180 mL; Refill: 0  -     benzonatate (TESSALON) 200 MG capsule; Take 1 capsule by mouth 3 (Three) Times a Day As Needed for Cough.  Dispense: 30 capsule; Refill: 0  -     cefTRIAXone (ROCEPHIN) injection 1,000 mg  -     methylPREDNISolone acetate (DEPO-medrol) injection 80 mg  -     dexAMETHasone (DECADRON) injection 10 mg       Assessment & Plan             Follow Up   No follow-ups on file.  Patient was given instructions and counseling regarding his condition or for health maintenance advice. Please see specific information pulled into the AVS if appropriate.     Patient or patient representative verbalized consent for the use of Ambient Listening during the visit with  Christopher Mejia Sr, MD for chart documentation. 11/10/2024  23:14 EST

## 2024-11-11 NOTE — ASSESSMENT & PLAN NOTE
Given the persistence of symptoms for 3 weeks, a bacterial infection is suspected. A regimen of Cipro has been prescribed. Additionally, prednisone has been recommended to alleviate inflammation. For nighttime coughing or sleep disturbances, Cheratussin has been prescribed. Tessalon Perles can be taken during the day. An antibiotic injection, Rocephin, and a Depo/dexamethasone injection will be administered today.

## 2025-06-12 NOTE — TELEPHONE ENCOUNTER
PATIENT CALLED IN TO FOLLOW UP ON A  REQUEST FOR HIS MEDICATION ON 03/23/2020. PATIENT IS OUT OF amoxicillin (AMOXIL) 875 MG tablet  AND HAVE NOT HEARD ANYTHING BACK YET . PHARMACY VERIFIED [address]. PATIENT CALL BACK [phone].     HTN (hypertension)

## 2025-07-01 ENCOUNTER — TELEMEDICINE (OUTPATIENT)
Dept: FAMILY MEDICINE CLINIC | Facility: CLINIC | Age: 70
End: 2025-07-01
Payer: MEDICARE

## 2025-07-01 VITALS — BODY MASS INDEX: 27.85 KG/M2 | HEIGHT: 69 IN | WEIGHT: 188 LBS

## 2025-07-01 DIAGNOSIS — R05.1 ACUTE COUGH: ICD-10-CM

## 2025-07-01 DIAGNOSIS — U07.1 COVID: Primary | ICD-10-CM

## 2025-07-01 DIAGNOSIS — J06.9 VIRAL UPPER RESPIRATORY TRACT INFECTION: ICD-10-CM

## 2025-07-01 PROCEDURE — 99214 OFFICE O/P EST MOD 30 MIN: CPT | Performed by: NURSE PRACTITIONER

## 2025-07-01 PROCEDURE — 1126F AMNT PAIN NOTED NONE PRSNT: CPT | Performed by: NURSE PRACTITIONER

## 2025-07-01 RX ORDER — DEXTROMETHORPHAN HYDROBROMIDE AND PROMETHAZINE HYDROCHLORIDE 15; 6.25 MG/5ML; MG/5ML
5 SYRUP ORAL 4 TIMES DAILY PRN
Qty: 240 ML | Refills: 1 | Status: SHIPPED | OUTPATIENT
Start: 2025-07-01

## 2025-07-01 NOTE — PROGRESS NOTES
"Chief Complaint  Covid-19 Home Monitoring Video Visit (Covid positive this morning sx started 6/28/)    Subjective        Endy Bagley presents to [facility]  History of Present Illness  Very pleasant patient here today complains of a cough congestion sore throat body aches fever itch Saturday no definite fever he has no shortness of breath, mostly sore throat sinuses  Has had previous COVID as well as immunizations he thinks he was given a immunization last fall for COVID  No Lung disease does not smoke  No severe symptoms,   Needs something for cough  Cough medicine with codeine, and was given antibiotic for potential secondary sinusitis previous COVID  Wondering if this may help?      Objective   Vital Signs:  Ht 175.3 cm (69.02\")   Wt 85.3 kg (188 lb)   BMI 27.75 kg/m²   Estimated body mass index is 27.75 kg/m² as calculated from the following:    Height as of this encounter: 175.3 cm (69.02\").    Weight as of this encounter: 85.3 kg (188 lb).          Physical Exam  Constitutional:       Appearance: Normal appearance. He is not ill-appearing or diaphoretic.      Comments: Pleasant appears well   Eyes:      Conjunctiva/sclera: Conjunctivae normal.      Pupils: Pupils are equal, round, and reactive to light.   Pulmonary:      Effort: Pulmonary effort is normal. No respiratory distress.      Comments: Able to speak sentences without dyspnea, respirations calm unlabored even  Skin:     General: Skin is warm and dry.   Neurological:      General: No focal deficit present.      Mental Status: Mental status is at baseline.   Psychiatric:         Mood and Affect: Mood normal.         Behavior: Behavior normal.         Thought Content: Thought content normal.         Judgment: Judgment normal.        Result Review :                Assessment and Plan   Diagnoses and all orders for this visit:    1. COVID (Primary)    2. Viral upper respiratory tract infection    3. Acute " cough    Other orders  -     Nirmatrelvir & Ritonavir, 300mg/100mg, (PAXLOVID); Take 3 tablets by mouth 2 (Two) Times a Day.  Dispense: 30 each; Refill: 0  -     promethazine-dextromethorphan (PROMETHAZINE-DM) 6.25-15 MG/5ML syrup; Take 5 mL by mouth 4 (Four) Times a Day As Needed for Cough. Caution sedation possible  Dispense: 240 mL; Refill: 1             Follow Up   No follow-ups on file.  Patient was given instructions and counseling regarding his condition or for health maintenance advice. Please see specific information pulled into the AVS if appropriate.     Discussed conservative treatment symptomatic treatment discussed the nature of COVID viral infection antibiotics not assisted  Secondary bacterial infections possible but not extremely common  Hydration most important treat fever or fever equivalents  Risk-benefit Paxlovid potential drug interaction  Relatively safe medication  Patient has risk factors but he has no severe symptoms presently discussed avoiding antiviral  He prefers to go forward if it may help him feel better a day or so and this very well may be the case were not sure but likely to decrease risk of complications    Should he have increased focal sinus pain tenderness that would be a sign or symptom possibly sinusitis but otherwise we should hold off on any prophylactic antibiotics due to risk  If needed he can use Afrin but no more than 3 days in general he should avoid Afrin for allergies  Update me on here in the next 48 hours as well as Monday and he can let me know how he is feeling Monday but sooner if need be and here to help  I will follow him until he is back to his baseline      Patient Instructions   Discharge instructions    COVID infection causing upper respiratory infection, probably some early bronchitis  Viral infection unfortunately antibiotics will not help but Paxlovid hopefully will decrease your symptoms and hopefully recovery sooner  Fluids fluids fluids  Ibuprofen  600  Or 800 mg 3 times a day for fever pain sinus pressure pain, aches and pains fever equivalent or fever    Start Paxlovid now but hold  Statin simvastatin for 10 days    Update me your progress Monday  The first week or so is the viral phase  After that is the post viral phase the immune response phase, and frequently we still have quite a bit of sinus congestion capillary dilation and sinus pressure due to increased blood flow of the sinuses, as well as postnasal drip aggravating the cough    Update me next week I will follow you into your well  Should you have complications of sinus pressures excetra reach out to me    But any increased chest pain shortness of breath severe headache facial swelling emergency room,  Promethazine DM lowest effective dose consider a half a teaspoon during the day  1 teaspoon at bedtime  If severe cough okay to take 2 teaspoons at bedtime only with extreme caution    Let me know if there is anything I can do for you please

## 2025-07-01 NOTE — PATIENT INSTRUCTIONS
Discharge instructions    COVID infection causing upper respiratory infection, probably some early bronchitis  Viral infection unfortunately antibiotics will not help but Paxlovid hopefully will decrease your symptoms and hopefully recovery sooner  Fluids fluids fluids  Ibuprofen 600  Or 800 mg 3 times a day for fever pain sinus pressure pain, aches and pains fever equivalent or fever    Start Paxlovid now but hold  Statin simvastatin for 10 days    Update me your progress Monday  The first week or so is the viral phase  After that is the post viral phase the immune response phase, and frequently we still have quite a bit of sinus congestion capillary dilation and sinus pressure due to increased blood flow of the sinuses, as well as postnasal drip aggravating the cough    Update me next week I will follow you into your well  Should you have complications of sinus pressures excetra reach out to me    But any increased chest pain shortness of breath severe headache facial swelling emergency room,  Promethazine DM lowest effective dose consider a half a teaspoon during the day  1 teaspoon at bedtime  If severe cough okay to take 2 teaspoons at bedtime only with extreme caution    Let me know if there is anything I can do for you please

## 2025-07-28 ENCOUNTER — OFFICE VISIT (OUTPATIENT)
Dept: FAMILY MEDICINE CLINIC | Facility: CLINIC | Age: 70
End: 2025-07-28
Payer: MEDICARE

## 2025-07-28 VITALS
WEIGHT: 191 LBS | DIASTOLIC BLOOD PRESSURE: 72 MMHG | OXYGEN SATURATION: 99 % | BODY MASS INDEX: 28.29 KG/M2 | HEIGHT: 69 IN | RESPIRATION RATE: 18 BRPM | SYSTOLIC BLOOD PRESSURE: 128 MMHG | HEART RATE: 67 BPM

## 2025-07-28 DIAGNOSIS — Z00.00 MEDICARE ANNUAL WELLNESS VISIT, SUBSEQUENT: Primary | ICD-10-CM

## 2025-07-28 DIAGNOSIS — I10 ESSENTIAL HYPERTENSION: ICD-10-CM

## 2025-07-28 DIAGNOSIS — Z12.5 SCREENING FOR PROSTATE CANCER: ICD-10-CM

## 2025-07-28 DIAGNOSIS — E78.5 HYPERLIPIDEMIA, ACQUIRED: ICD-10-CM

## 2025-07-28 PROCEDURE — 3078F DIAST BP <80 MM HG: CPT | Performed by: FAMILY MEDICINE

## 2025-07-28 PROCEDURE — 3074F SYST BP LT 130 MM HG: CPT | Performed by: FAMILY MEDICINE

## 2025-07-28 PROCEDURE — 1126F AMNT PAIN NOTED NONE PRSNT: CPT | Performed by: FAMILY MEDICINE

## 2025-07-28 PROCEDURE — 1159F MED LIST DOCD IN RCRD: CPT | Performed by: FAMILY MEDICINE

## 2025-07-28 PROCEDURE — 1160F RVW MEDS BY RX/DR IN RCRD: CPT | Performed by: FAMILY MEDICINE

## 2025-07-28 PROCEDURE — G0439 PPPS, SUBSEQ VISIT: HCPCS | Performed by: FAMILY MEDICINE

## 2025-07-28 NOTE — ASSESSMENT & PLAN NOTE
{Hypertension is (optional):[id number]}    Orders:    CBC & Differential    Comprehensive Metabolic Panel    Lipid Panel With / Chol / HDL Ratio    TSH    UA / M With / Rflx Culture([facility] ONLY) - Urine, Clean Catch

## 2025-07-28 NOTE — ASSESSMENT & PLAN NOTE
{Hyperlipidemia A/P Block (Optional):[id number]}    Orders:    CBC & Differential    Comprehensive Metabolic Panel    Lipid Panel With / Chol / HDL Ratio

## 2025-07-28 NOTE — PROGRESS NOTES
Subjective   The ABCs of the Annual Wellness Visit  Medicare Wellness Visit      Endy Bagley is a 70 y.o. patient who presents for a Medicare Wellness Visit.    The following portions of the patient's history were reviewed and   updated as appropriate: allergies, current medications, past family history, past medical history, past social history, past surgical history, and problem list.    Compared to one year ago, the patient's physical   health is the same.  Compared to one year ago, the patient's mental   health is the same.    Recent Hospitalizations:  He was not admitted to the hospital during the last year.     Current Medical Providers:  Patient Care Team:  Christopher Mejia Sr., MD as PCP - General (Family Medicine)  tSephen Roach MD as Consulting Physician (Hematology and Oncology)  Teto Russell Jr., MD as Referring Physician (Family Medicine)  Jose Duffy MD as Consulting Physician (Hematology and Oncology)    Outpatient Medications Prior to Visit   Medication Sig Dispense Refill    aspirin 81 MG tablet Take 1 tablet by mouth Daily.      atenolol (TENORMIN) 25 MG tablet Take 1 tablet by mouth Daily. 90 tablet 3    montelukast (Singulair) 10 MG tablet Take 1 tablet by mouth Every Night. 90 tablet 3    simvastatin (ZOCOR) 20 MG tablet Take 1 tablet by mouth Every Night. 90 tablet 3    Nirmatrelvir & Ritonavir, 300mg/100mg, (PAXLOVID) Take 3 tablets by mouth 2 (Two) Times a Day. 30 each 0    promethazine-dextromethorphan (PROMETHAZINE-DM) 6.25-15 MG/5ML syrup Take 5 mL by mouth 4 (Four) Times a Day As Needed for Cough. Caution sedation possible 240 mL 1     No facility-administered medications prior to visit.     No opioid medication identified on active medication list. I have reviewed chart for other potential  high risk medication/s and harmful drug interactions in the elderly.      Aspirin is on active medication list. Aspirin use is indicated based on review of current medical  "condition/s. Pros and cons of this therapy have been discussed today. Benefits of this medication outweigh potential harm.  Patient has been encouraged to continue taking this medication.  .      Patient Active Problem List   Diagnosis    Chronic lymphocytic leukemia of B-cell type    Subclavian artery stenosis, left    Hyperlipidemia, acquired    Gastroesophageal reflux disease with esophagitis    Essential hypertension    Tendinitis of left knee    Acute non-recurrent maxillary sinusitis    Acute cough    Medicare annual wellness visit, subsequent    Congestion of nasal sinus     Advance Care Planning Advance Directive is not on file.  ACP discussion was held with the patient during this visit. Patient does not have an advance directive, declines further assistance.            Objective   Vitals:    07/28/25 1308   BP: 128/72   Pulse: 67   Resp: 18   SpO2: 99%   Weight: 86.6 kg (191 lb)   Height: 175.3 cm (69.02\")   PainSc: 0-No pain       Estimated body mass index is 28.19 kg/m² as calculated from the following:    Height as of this encounter: 175.3 cm (69.02\").    Weight as of this encounter: 86.6 kg (191 lb).    BMI is >= 25 and <30. (Overweight) The following options were offered after discussion;: exercise counseling/recommendations and nutrition counseling/recommendations           Does the patient have evidence of cognitive impairment? No          Mini-Mental State Examination (MMSE)        Instructions: Ask the questions in the order listed. Score one point for each correct response within each question or activity.      Maximum Score  Patient’s Score  Questions    5  5  “What is the year?  Season?  Date?  Day of the week?  Month?”    5   5 “Where are we now: State?  County?  Town/city?  Hospital?  Floor?”    3   3 The examiner names three unrelated objects clearly and slowly, then asks the patient to name all three of them. The patient’s response is used for scoring. The examiner repeats them until " patient learns all of them, if possible. Number of trials: ___________    5  5  “I would like you to count backward from 100 by sevens.” (93, 86, 79, 72, 65, …) Stop after five answers.   Alternative: “Spell WORLD backwards.” ([id number])    3   3 “Earlier I told you the names of three things. Can you tell me what those were?”    2  2  Show the patient two simple objects, such as a wristwatch and a pencil, and ask the patient to name them.    1  1  “Repeat the phrase: ‘No ifs, ands, or buts.’”    3  3  “Take the paper in your right hand, fold it in half, and put it on the floor.”   (The examiner gives the patient a piece of blank paper.)    1  1  “Please read this and do what it says.” (Written instruction is “Close your eyes.”)    1   1 “Make up and write a sentence about anything.” (This sentence must contain a noun and a verb.)    1   1 “Please copy this picture.” (The examiner gives the patient a blank piece of paper and asks him/her to draw the symbol below. All 10 angles must be present and two must intersect.)             30  30  TOTAL                                                                                            Health  Risk Assessment    Smoking Status:  Social History     Tobacco Use   Smoking Status Former    Types: Cigars   Smokeless Tobacco Never     Alcohol Consumption:  Social History     Substance and Sexual Activity   Alcohol Use Yes    Alcohol/week: 4.0 standard drinks of alcohol    Types: 4 Cans of beer per week    Comment: 3-4 cans of beer daily       Fall Risk Screen  STEADI Fall Risk Assessment was completed, and patient is at LOW risk for falls.Assessment completed on:2025    Depression Screening   Little interest or pleasure in doing things? Not at all   Feeling down, depressed, or hopeless? Not at all   PHQ-2 Total Score 0      Health Habits and Functional and Cognitive Screenin/22/2025     9:10 PM   Functional & Cognitive Status   Do you have difficulty preparing  food and eating? No   Do you have difficulty bathing yourself, getting dressed or grooming yourself? No   Do you have difficulty using the toilet? No   Do you have difficulty moving around from place to place? No   Do you have trouble with steps or getting out of a bed or a chair? No   Current Diet Low Fat Diet   Dental Exam Not up to date   Eye Exam Not up to date   Exercise (times per week) 0 times per week   Current Exercises Include No Regular Exercise   Do you need help using the phone?  No   Are you deaf or do you have serious difficulty hearing?  No   Do you need help to go to places out of walking distance? No   Do you need help shopping? No   Do you need help preparing meals?  No   Do you need help with housework?  No   Do you need help with laundry? No   Do you need help taking your medications? No   Do you need help managing money? No   Do you ever drive or ride in a car without wearing a seat belt? No   Have you felt unusual fatigue (could be tiredness), stress, anger or loneliness in the last month? No   Who do you live with? Spouse   If you need help, do you have trouble finding someone available to you? No   Have you been bothered in the last four weeks by sexual problems? No   Do you have difficulty concentrating, remembering or making decisions? No           Age-appropriate Screening Schedule:  Refer to the list below for future screening recommendations based on patient's age, sex and/or medical conditions. Orders for these recommended tests are listed in the plan section. The patient has been provided with a written plan.    Health Maintenance List  Health Maintenance   Topic Date Due    LIPID PANEL  03/18/2025    COVID-19 Vaccine (7 - Moderna risk 2024-25 season) 08/23/2025    INFLUENZA VACCINE  10/01/2025    COLORECTAL CANCER SCREENING  02/27/2026    ANNUAL WELLNESS VISIT  07/28/2026    TDAP/TD VACCINES (2 - Td or Tdap) 08/14/2027    HEPATITIS C SCREENING  Completed    Pneumococcal Vaccine 50+   "Completed    AAA SCREEN ONCE  Completed    ZOSTER VACCINE  Completed                                                                                                                                                CMS Preventative Services Quick Reference  Risk Factors Identified During Encounter  Polypharmacy: Medication List reviewed and Medications are appropriate for patient    The above risks/problems have been discussed with the patient.  Pertinent information has been shared with the patient in the After Visit Summary.  An After Visit Summary and PPPS were made available to the patient.    Follow Up:   Next Medicare Wellness visit to be scheduled in 1 year.         Additional E&M Note during same encounter follows:  Patient has additional, significant, and separately identifiable condition(s)/problem(s) that require work above and beyond the Medicare Wellness Visit     Chief Complaint  Medicare Wellness-subsequent (AWV)    Subjective   HPI      Review of Systems   All other systems reviewed and are negative.             Objective   Vital Signs:  /72   Pulse 67   Resp 18   Ht 175.3 cm (69.02\")   Wt 86.6 kg (191 lb)   SpO2 99%   BMI 28.19 kg/m²   Physical Exam  Vitals reviewed.   Constitutional:       General: He is not in acute distress.     Appearance: Normal appearance. He is well-developed and normal weight. He is not ill-appearing.   HENT:      Head: Normocephalic and atraumatic.      Right Ear: Tympanic membrane, ear canal and external ear normal.      Left Ear: Tympanic membrane, ear canal and external ear normal.      Nose: Nose normal.      Mouth/Throat:      Mouth: Mucous membranes are moist.      Pharynx: Oropharynx is clear. No posterior oropharyngeal erythema.   Eyes:      General: Lids are normal.      Extraocular Movements: Extraocular movements intact.      Conjunctiva/sclera: Conjunctivae normal.      Pupils: Pupils are equal, round, and reactive to light.   Cardiovascular:      Rate " and Rhythm: Normal rate and regular rhythm.      Pulses: Normal pulses.      Heart sounds: Normal heart sounds.   Pulmonary:      Effort: Pulmonary effort is normal. No respiratory distress.      Breath sounds: Normal breath sounds. No stridor.   Abdominal:      General: Abdomen is flat. Bowel sounds are normal.      Palpations: Abdomen is soft.   Musculoskeletal:         General: Normal range of motion.      Cervical back: Normal range of motion and neck supple.   Skin:     General: Skin is warm and dry.      Capillary Refill: Capillary refill takes less than 2 seconds.   Neurological:      General: No focal deficit present.      Mental Status: He is alert and oriented to person, place, and time. Mental status is at baseline.      Cranial Nerves: No cranial nerve deficit.      Sensory: No sensory deficit.      Motor: No weakness.      Coordination: Coordination normal.      Gait: Gait normal.      Deep Tendon Reflexes: Reflexes normal.   Psychiatric:         Mood and Affect: Mood normal.         Behavior: Behavior normal.         Thought Content: Thought content normal.         Judgment: Judgment normal.         The following data was reviewed by: Christopher Mejia Sr, MD on 07/28/2025:           Assessment and Plan Additional age appropriate preventative wellness advice topics were discussed during today's preventative wellness exam(some topics already addressed during AWV portion of the note above):    Physical Activity: Advised cardiovascular activity 150 minutes per week as tolerated. (example brisk walk for 30 minutes, 5 days a week).   Nutrition: Discussed nutrition plan with patient. Information shared in after visit summary. Goal is for a well balanced diet to enhance overall health.   Healthy Weight: Discussed current and goal BMI with patient. Steps to attain this goal discussed. Information shared in after visit summary.     Medicare annual wellness visit, subsequent  Medicare wellness completed.  Discussed  advanced directives with patient.  Performed the Mini-Mental exam and patient scored 30/30.         Hyperlipidemia, acquired       Orders:    CBC & Differential    Comprehensive Metabolic Panel    Lipid Panel With / Chol / HDL Ratio    Essential hypertension      Orders:    CBC & Differential    Comprehensive Metabolic Panel    Lipid Panel With / Chol / HDL Ratio    TSH    UA / M With / Rflx Culture([facility] ONLY) - Urine, Clean Catch    Screening for prostate cancer    Orders:    PSA Screen            Follow Up   No follow-ups on file.  Patient was given instructions and counseling regarding his condition or for health maintenance advice. Please see specific information pulled into the AVS if appropriate.

## 2025-08-11 DIAGNOSIS — I10 ESSENTIAL HYPERTENSION: ICD-10-CM

## 2025-08-11 RX ORDER — ATENOLOL 25 MG/1
25 TABLET ORAL DAILY
Qty: 90 TABLET | Refills: 2 | Status: SHIPPED | OUTPATIENT
Start: 2025-08-11